# Patient Record
Sex: MALE | Race: WHITE | NOT HISPANIC OR LATINO
[De-identification: names, ages, dates, MRNs, and addresses within clinical notes are randomized per-mention and may not be internally consistent; named-entity substitution may affect disease eponyms.]

---

## 2019-12-03 ENCOUNTER — APPOINTMENT (OUTPATIENT)
Dept: RADIOLOGY | Facility: CLINIC | Age: 64
End: 2019-12-03
Payer: COMMERCIAL

## 2019-12-03 PROCEDURE — 71046 X-RAY EXAM CHEST 2 VIEWS: CPT

## 2019-12-04 ENCOUNTER — INPATIENT (INPATIENT)
Facility: HOSPITAL | Age: 64
LOS: 12 days | Discharge: ANOTHER HOSPITAL | End: 2019-12-17
Admitting: STUDENT IN AN ORGANIZED HEALTH CARE EDUCATION/TRAINING PROGRAM
Payer: COMMERCIAL

## 2019-12-04 ENCOUNTER — OUTPATIENT (OUTPATIENT)
Dept: OUTPATIENT SERVICES | Facility: HOSPITAL | Age: 64
LOS: 1 days | End: 2019-12-04

## 2019-12-04 PROCEDURE — 71046 X-RAY EXAM CHEST 2 VIEWS: CPT | Mod: 26

## 2019-12-04 PROCEDURE — 74177 CT ABD & PELVIS W/CONTRAST: CPT | Mod: 26

## 2019-12-04 PROCEDURE — 99285 EMERGENCY DEPT VISIT HI MDM: CPT

## 2019-12-04 PROCEDURE — 71260 CT THORAX DX C+: CPT | Mod: 26

## 2019-12-05 ENCOUNTER — OUTPATIENT (OUTPATIENT)
Dept: OUTPATIENT SERVICES | Facility: HOSPITAL | Age: 64
LOS: 1 days | End: 2019-12-05

## 2019-12-05 PROCEDURE — 93306 TTE W/DOPPLER COMPLETE: CPT | Mod: 26

## 2019-12-06 ENCOUNTER — OUTPATIENT (OUTPATIENT)
Dept: OUTPATIENT SERVICES | Facility: HOSPITAL | Age: 64
LOS: 1 days | End: 2019-12-06

## 2019-12-06 PROCEDURE — 49405 IMAGE CATH FLUID COLXN VISC: CPT | Mod: 59

## 2019-12-07 ENCOUNTER — OUTPATIENT (OUTPATIENT)
Dept: OUTPATIENT SERVICES | Facility: HOSPITAL | Age: 64
LOS: 1 days | End: 2019-12-07

## 2019-12-08 ENCOUNTER — OUTPATIENT (OUTPATIENT)
Dept: OUTPATIENT SERVICES | Facility: HOSPITAL | Age: 64
LOS: 1 days | End: 2019-12-08

## 2019-12-09 ENCOUNTER — OUTPATIENT (OUTPATIENT)
Dept: OUTPATIENT SERVICES | Facility: HOSPITAL | Age: 64
LOS: 1 days | End: 2019-12-09

## 2019-12-10 ENCOUNTER — OUTPATIENT (OUTPATIENT)
Dept: OUTPATIENT SERVICES | Facility: HOSPITAL | Age: 64
LOS: 1 days | End: 2019-12-10

## 2019-12-10 PROCEDURE — 74183 MRI ABD W/O CNTR FLWD CNTR: CPT | Mod: 26

## 2019-12-11 ENCOUNTER — OUTPATIENT (OUTPATIENT)
Dept: OUTPATIENT SERVICES | Facility: HOSPITAL | Age: 64
LOS: 1 days | End: 2019-12-11

## 2019-12-12 ENCOUNTER — OUTPATIENT (OUTPATIENT)
Dept: OUTPATIENT SERVICES | Facility: HOSPITAL | Age: 64
LOS: 1 days | End: 2019-12-12

## 2019-12-12 PROBLEM — Z00.00 ENCOUNTER FOR PREVENTIVE HEALTH EXAMINATION: Status: ACTIVE | Noted: 2019-12-12

## 2019-12-13 ENCOUNTER — OUTPATIENT (OUTPATIENT)
Dept: OUTPATIENT SERVICES | Facility: HOSPITAL | Age: 64
LOS: 1 days | End: 2019-12-13

## 2019-12-13 PROCEDURE — 71045 X-RAY EXAM CHEST 1 VIEW: CPT | Mod: 26

## 2019-12-14 ENCOUNTER — OUTPATIENT (OUTPATIENT)
Dept: OUTPATIENT SERVICES | Facility: HOSPITAL | Age: 64
LOS: 1 days | End: 2019-12-14

## 2019-12-15 ENCOUNTER — OUTPATIENT (OUTPATIENT)
Dept: OUTPATIENT SERVICES | Facility: HOSPITAL | Age: 64
LOS: 1 days | End: 2019-12-15

## 2019-12-16 ENCOUNTER — OUTPATIENT (OUTPATIENT)
Dept: OUTPATIENT SERVICES | Facility: HOSPITAL | Age: 64
LOS: 1 days | End: 2019-12-16

## 2019-12-17 ENCOUNTER — INPATIENT (INPATIENT)
Facility: HOSPITAL | Age: 64
LOS: 6 days | Discharge: HOME | DRG: 441 | End: 2019-12-24
Attending: INTERNAL MEDICINE | Admitting: INTERNAL MEDICINE
Payer: COMMERCIAL

## 2019-12-17 ENCOUNTER — OUTPATIENT (OUTPATIENT)
Dept: OUTPATIENT SERVICES | Facility: HOSPITAL | Age: 64
LOS: 1 days | End: 2019-12-17

## 2019-12-17 ENCOUNTER — RESULT REVIEW (OUTPATIENT)
Age: 64
End: 2019-12-17

## 2019-12-17 VITALS
DIASTOLIC BLOOD PRESSURE: 84 MMHG | OXYGEN SATURATION: 97 % | WEIGHT: 179.9 LBS | RESPIRATION RATE: 18 BRPM | SYSTOLIC BLOOD PRESSURE: 120 MMHG | TEMPERATURE: 98 F | HEART RATE: 90 BPM

## 2019-12-17 DIAGNOSIS — K75.0 ABSCESS OF LIVER: ICD-10-CM

## 2019-12-17 LAB
ALBUMIN SERPL ELPH-MCNC: 2.8 G/DL — LOW (ref 3.3–5.2)
ALP SERPL-CCNC: 130 U/L — HIGH (ref 40–120)
ALT FLD-CCNC: 15 U/L — SIGNIFICANT CHANGE UP
ANION GAP SERPL CALC-SCNC: 13 MMOL/L — SIGNIFICANT CHANGE UP (ref 5–17)
APTT BLD: 32.9 SEC — SIGNIFICANT CHANGE UP (ref 27.5–36.3)
AST SERPL-CCNC: 19 U/L — SIGNIFICANT CHANGE UP
BASOPHILS # BLD AUTO: 0.03 K/UL — SIGNIFICANT CHANGE UP (ref 0–0.2)
BASOPHILS NFR BLD AUTO: 0.2 % — SIGNIFICANT CHANGE UP (ref 0–2)
BILIRUB SERPL-MCNC: 0.2 MG/DL — LOW (ref 0.4–2)
BUN SERPL-MCNC: 11 MG/DL — SIGNIFICANT CHANGE UP (ref 8–20)
CALCIUM SERPL-MCNC: 9.3 MG/DL — SIGNIFICANT CHANGE UP (ref 8.6–10.2)
CHLORIDE SERPL-SCNC: 97 MMOL/L — LOW (ref 98–107)
CO2 SERPL-SCNC: 23 MMOL/L — SIGNIFICANT CHANGE UP (ref 22–29)
CREAT SERPL-MCNC: 0.65 MG/DL — SIGNIFICANT CHANGE UP (ref 0.5–1.3)
EOSINOPHIL # BLD AUTO: 0.48 K/UL — SIGNIFICANT CHANGE UP (ref 0–0.5)
EOSINOPHIL NFR BLD AUTO: 2.9 % — SIGNIFICANT CHANGE UP (ref 0–6)
FOLATE SERPL-MCNC: 12.1 NG/ML — SIGNIFICANT CHANGE UP
GLUCOSE SERPL-MCNC: 88 MG/DL — SIGNIFICANT CHANGE UP (ref 70–115)
HCT VFR BLD CALC: 28.2 % — LOW (ref 39–50)
HGB BLD-MCNC: 8.6 G/DL — LOW (ref 13–17)
IMM GRANULOCYTES NFR BLD AUTO: 1 % — SIGNIFICANT CHANGE UP (ref 0–1.5)
INR BLD: 1.24 RATIO — HIGH (ref 0.88–1.16)
LIDOCAIN IGE QN: 16 U/L — LOW (ref 22–51)
LYMPHOCYTES # BLD AUTO: 19.7 % — SIGNIFICANT CHANGE UP (ref 13–44)
LYMPHOCYTES # BLD AUTO: 3.31 K/UL — HIGH (ref 1–3.3)
MCHC RBC-ENTMCNC: 26.7 PG — LOW (ref 27–34)
MCHC RBC-ENTMCNC: 30.5 GM/DL — LOW (ref 32–36)
MCV RBC AUTO: 87.6 FL — SIGNIFICANT CHANGE UP (ref 80–100)
MONOCYTES # BLD AUTO: 1.29 K/UL — HIGH (ref 0–0.9)
MONOCYTES NFR BLD AUTO: 7.7 % — SIGNIFICANT CHANGE UP (ref 2–14)
NEUTROPHILS # BLD AUTO: 11.54 K/UL — HIGH (ref 1.8–7.4)
NEUTROPHILS NFR BLD AUTO: 68.5 % — SIGNIFICANT CHANGE UP (ref 43–77)
PLATELET # BLD AUTO: 356 K/UL — SIGNIFICANT CHANGE UP (ref 150–400)
POTASSIUM SERPL-MCNC: 4.5 MMOL/L — SIGNIFICANT CHANGE UP (ref 3.5–5.3)
POTASSIUM SERPL-SCNC: 4.5 MMOL/L — SIGNIFICANT CHANGE UP (ref 3.5–5.3)
PROT SERPL-MCNC: 6.9 G/DL — SIGNIFICANT CHANGE UP (ref 6.6–8.7)
PROTHROM AB SERPL-ACNC: 14.4 SEC — HIGH (ref 10–12.9)
RBC # BLD: 3.22 M/UL — LOW (ref 4.2–5.8)
RBC # FLD: 16.2 % — HIGH (ref 10.3–14.5)
SODIUM SERPL-SCNC: 133 MMOL/L — LOW (ref 135–145)
WBC # BLD: 16.81 K/UL — HIGH (ref 3.8–10.5)
WBC # FLD AUTO: 16.81 K/UL — HIGH (ref 3.8–10.5)

## 2019-12-17 PROCEDURE — 74177 CT ABD & PELVIS W/CONTRAST: CPT | Mod: 26

## 2019-12-17 PROCEDURE — 88305 TISSUE EXAM BY PATHOLOGIST: CPT | Mod: 26

## 2019-12-17 PROCEDURE — 99285 EMERGENCY DEPT VISIT HI MDM: CPT

## 2019-12-17 PROCEDURE — 99223 1ST HOSP IP/OBS HIGH 75: CPT

## 2019-12-17 PROCEDURE — 71260 CT THORAX DX C+: CPT | Mod: 26

## 2019-12-17 PROCEDURE — 88112 CYTOPATH CELL ENHANCE TECH: CPT | Mod: 26

## 2019-12-17 RX ORDER — SIMVASTATIN 20 MG/1
20 TABLET, FILM COATED ORAL AT BEDTIME
Refills: 0 | Status: DISCONTINUED | OUTPATIENT
Start: 2019-12-17 | End: 2019-12-24

## 2019-12-17 RX ORDER — ENOXAPARIN SODIUM 100 MG/ML
40 INJECTION SUBCUTANEOUS DAILY
Refills: 0 | Status: DISCONTINUED | OUTPATIENT
Start: 2019-12-17 | End: 2019-12-19

## 2019-12-17 RX ORDER — SODIUM CHLORIDE 9 MG/ML
1000 INJECTION INTRAMUSCULAR; INTRAVENOUS; SUBCUTANEOUS ONCE
Refills: 0 | Status: COMPLETED | OUTPATIENT
Start: 2019-12-17 | End: 2019-12-17

## 2019-12-17 RX ORDER — TRAMADOL HYDROCHLORIDE 50 MG/1
25 TABLET ORAL THREE TIMES A DAY
Refills: 0 | Status: DISCONTINUED | OUTPATIENT
Start: 2019-12-17 | End: 2019-12-18

## 2019-12-17 RX ORDER — VENLAFAXINE HCL 75 MG
150 CAPSULE, EXT RELEASE 24 HR ORAL DAILY
Refills: 0 | Status: DISCONTINUED | OUTPATIENT
Start: 2019-12-17 | End: 2019-12-24

## 2019-12-17 RX ORDER — PANTOPRAZOLE SODIUM 20 MG/1
40 TABLET, DELAYED RELEASE ORAL
Refills: 0 | Status: DISCONTINUED | OUTPATIENT
Start: 2019-12-17 | End: 2019-12-24

## 2019-12-17 RX ORDER — PIPERACILLIN AND TAZOBACTAM 4; .5 G/20ML; G/20ML
3.38 INJECTION, POWDER, LYOPHILIZED, FOR SOLUTION INTRAVENOUS ONCE
Refills: 0 | Status: COMPLETED | OUTPATIENT
Start: 2019-12-17 | End: 2019-12-17

## 2019-12-17 RX ORDER — LACTOBACILLUS ACIDOPHILUS 100MM CELL
1 CAPSULE ORAL
Refills: 0 | Status: DISCONTINUED | OUTPATIENT
Start: 2019-12-17 | End: 2019-12-24

## 2019-12-17 RX ORDER — PIPERACILLIN AND TAZOBACTAM 4; .5 G/20ML; G/20ML
3.38 INJECTION, POWDER, LYOPHILIZED, FOR SOLUTION INTRAVENOUS EVERY 8 HOURS
Refills: 0 | Status: COMPLETED | OUTPATIENT
Start: 2019-12-17 | End: 2019-12-24

## 2019-12-17 RX ORDER — ALPRAZOLAM 0.25 MG
0.25 TABLET ORAL THREE TIMES A DAY
Refills: 0 | Status: DISCONTINUED | OUTPATIENT
Start: 2019-12-17 | End: 2019-12-24

## 2019-12-17 RX ORDER — LANOLIN ALCOHOL/MO/W.PET/CERES
3 CREAM (GRAM) TOPICAL AT BEDTIME
Refills: 0 | Status: DISCONTINUED | OUTPATIENT
Start: 2019-12-17 | End: 2019-12-24

## 2019-12-17 RX ADMIN — Medication 0.25 MILLIGRAM(S): at 23:52

## 2019-12-17 RX ADMIN — TRAMADOL HYDROCHLORIDE 25 MILLIGRAM(S): 50 TABLET ORAL at 18:37

## 2019-12-17 RX ADMIN — SODIUM CHLORIDE 1000 MILLILITER(S): 9 INJECTION INTRAMUSCULAR; INTRAVENOUS; SUBCUTANEOUS at 17:05

## 2019-12-17 RX ADMIN — PIPERACILLIN AND TAZOBACTAM 200 GRAM(S): 4; .5 INJECTION, POWDER, LYOPHILIZED, FOR SOLUTION INTRAVENOUS at 18:36

## 2019-12-17 NOTE — CONSULT NOTE ADULT - ASSESSMENT
The patient with fever, liver abscess, lung lesion. ? concern for cholangiocarcinoma. Patient has improved a lot. Most likely infectious etiology. The periductal changes in right liver lobe can be inflammatory. I will recommend repeating CT chest, CT abdomen/pelvis (liver protocol).  Continue diet  Anemia: Stable from previous labs. Check iron level, ferritin in am, vitamin b12, folate  No need for ERCP at this time, till all the imaging is done  ID consult

## 2019-12-17 NOTE — H&P ADULT - HISTORY OF PRESENT ILLNESS
64 yr old with PMH of Depression, HLD was complaining of lethargy, wt loss 10 lb, fever and chills for some time. He did not initially pursue any help as his dog was sick. Then he went to his PCP and noted to have abnormal labs. He went to [facility] on 19. Was noted to have Leucocytosis, thrombocytosis, Ct imaging showing Rt hepatic lobe massess, pulm nodules concerning for malig/ metastasis, Cholelithiasis without cholecystitis. Hepatic abscess suspected and 2 drains inserted by IR. Fluid c/s from 19 positive for Gram neg rods. Blood c/s neg to date. Was treated with Vanc/ Zosyn/ Flagyl. 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 Tumor markers normal.   Denies fever/ pain/ n/v    SH- former smoker, current alcohol use, current marijuana use  FH- M-  - leukemia; F- alive

## 2019-12-17 NOTE — ED ADULT NURSE NOTE - CHIEF COMPLAINT QUOTE
pt BIBA from [facility] [place] as transfer for r/o liver mass and for GI intervention. pt AOX3 on arrival, 22g to right hand in place. pt sent for ERCP and endoscopy. pt offering no complaints on arrival. even and unlabored resps.

## 2019-12-17 NOTE — CONSULT NOTE ADULT - SUBJECTIVE AND OBJECTIVE BOX
HPI: 64 year old man who was in [facility] and now being transferred to [facility]. He was admitted to [facility] [place] for fever, chills, leukocytosis, liver lesion and lung lesions. He was complaining of fever and chills for some time. He did not initially pursue any help as his dog was sick. Then he went to his PCP and noted to have abnormal labs. He was noted to have lung lesions. Admitted to [facility] [place]. Noted to have WBC of 42 and about 7 cm liver lesion in right lobe. There was concern for lung and adrenal nodules. He had blood cultures which were negative. IR drain was placed which showed liver abscess. He had gram negative rods-not identified yet. Seen by ID. 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 He is on antibiotics. His [id number]-Mgf phosphatase which was initially 400 + has normalized. Bilirubin, AST, ALT are normal. No abdominal pain. Eating ok.   Ca 19-9 level, AFP level normal.     There is ? hemochromatosis history. Also noted to be anemic. He had colonoscopy this year in april. As per patient it was normal.    TTE was unremarkable.      PAST MEDICAL & SURGICAL HISTORY:  Cholesterol depletion  Depression      ROS:  No Heartburn, regurgitation, dysphagia, odynophagia.  No dyspepsia  No abdominal pain.    No Nausea, vomiting.  No Bleeding.  No hematemesis.   No diarrhea.    No hematochesia.  No weight loss, anorexia.  No edema.      MEDICATIONS  (STANDING):  sodium chloride 0.9% Bolus 1000 milliLiter(s) IV Bolus once    MEDICATIONS  (PRN):      Allergies    No Known Allergies    Intolerances        SOCIAL HISTORY:Denies x 3    ENDOSCOPIC/GI HISTORY:    FAMILY HISTORY:NC      Vital Signs Last 24 Hrs  T(C): 36.9 (17 Dec 2019 14:51), Max: 36.9 (17 Dec 2019 14:51)  T(F): 98.4 (17 Dec 2019 14:51), Max: 98.4 (17 Dec 2019 14:51)  HR: 90 (17 Dec 2019 14:51) (90 - 90)  BP: 120/84 (17 Dec 2019 14:51) (120/84 - 120/84)  BP(mean): --  RR: 18 (17 Dec 2019 14:51) (18 - 18)  SpO2: 97% (17 Dec 2019 14:51) (97% - 97%)    PHYSICAL EXAM:    GENERAL: NAD, well-groomed, well-developed  HEAD:  Atraumatic, Normocephalic  EYES: EOMI, PERRLA, conjunctiva and sclera clear  ENMT: No tonsillar erythema, exudates, or enlargement; Moist mucous membranes, Good dentition, No lesions  NECK: Supple, No JVD, Normal thyroid  CHEST/LUNG: Clear to percussion bilaterally; No rales, rhonchi, wheezing, or rubs  HEART: Regular rate and rhythm; No murmurs, rubs, or gallops  ABDOMEN: Soft, Nontender, Nondistended; Bowel sounds present  EXTREMITIES:  2+ Peripheral Pulses, No clubbing, cyanosis, or edema  LYMPH: No lymphadenopathy noted  SKIN: No rashes or lesions      LABS:                        8.6    16.81 )-----------( 356      ( 17 Dec 2019 16:22 )             28.2     12-17    133<L>  |  97<L>  |  11.0  ----------------------------<  88  4.5   |  23.0  |  0.65    Ca    9.3      17 Dec 2019 16:22    TPro  6.9  /  Alb  2.8<L>  /  TBili  0.2<L>  /  DBili  x   /  AST  19  /  ALT  15  /  AlkPhos  130<H>  12-17    PT/INR - ( 17 Dec 2019 16:21 )   PT: 14.4 sec;   INR: 1.24 ratio         PTT - ( 17 Dec 2019 16:21 )  PTT:32.9 sec       LIVER FUNCTIONS - ( 17 Dec 2019 16:22 )  Alb: 2.8 g/dL / Pro: 6.9 g/dL / ALK PHOS: 130 U/L / ALT: 15 U/L / AST: 19 U/L / GGT: x               RADIOLOGY & ADDITIONAL STUDIES: HPI: 64 year old man who was in [facility] and now being transferred to [facility]. He was admitted to [facility] for fever, chills, leukocytosis, liver lesion and lung lesions. He was complaining of fever and chills for some time. He did not initially pursue any help as his dog was sick. Then he went to his PCP and noted to have abnormal labs. He was noted to have lung lesions. Admitted to [facility]. Noted to have WBC of 42 and about 7 cm liver lesion in right lobe. There was concern for lung and adrenal nodules. He had blood cultures which were negative. IR drain was placed which showed liver abscess. He had gram negative rods-not identified yet. Seen by ID. 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 He is on antibiotics. His [id number]-Oov phosphatase which was initially 400 + has normalized. Bilirubin, AST, ALT are normal. No abdominal pain. Eating ok.   Ca 19-9 level, AFP level normal.     There is ? hemochromatosis history. Also noted to be anemic. He had colonoscopy this year in april. As per patient it was normal.    TTE was unremarkable.      PAST MEDICAL & SURGICAL HISTORY:  Cholesterol depletion  Depression      ROS:  No Heartburn, regurgitation, dysphagia, odynophagia.  No dyspepsia  No abdominal pain.    No Nausea, vomiting.  No Bleeding.  No hematemesis.   No diarrhea.    No hematochesia.  No weight loss, anorexia.  No edema.      MEDICATIONS  (STANDING):  sodium chloride 0.9% Bolus 1000 milliLiter(s) IV Bolus once    MEDICATIONS  (PRN):      Allergies    No Known Allergies    Intolerances        SOCIAL HISTORY:Denies x 3    ENDOSCOPIC/GI HISTORY:    FAMILY HISTORY:NC      Vital Signs Last 24 Hrs  T(C): 36.9 (17 Dec 2019 14:51), Max: 36.9 (17 Dec 2019 14:51)  T(F): 98.4 (17 Dec 2019 14:51), Max: 98.4 (17 Dec 2019 14:51)  HR: 90 (17 Dec 2019 14:51) (90 - 90)  BP: 120/84 (17 Dec 2019 14:51) (120/84 - 120/84)  BP(mean): --  RR: 18 (17 Dec 2019 14:51) (18 - 18)  SpO2: 97% (17 Dec 2019 14:51) (97% - 97%)    PHYSICAL EXAM:    GENERAL: NAD, well-groomed, well-developed  HEAD:  Atraumatic, Normocephalic  EYES: EOMI, PERRLA, conjunctiva and sclera clear  ENMT: No tonsillar erythema, exudates, or enlargement; Moist mucous membranes, Good dentition, No lesions  NECK: Supple, No JVD, Normal thyroid  CHEST/LUNG: Clear to percussion bilaterally; No rales, rhonchi, wheezing, or rubs  HEART: Regular rate and rhythm; No murmurs, rubs, or gallops  ABDOMEN: Soft, Nontender, Nondistended; Bowel sounds present. Right sided drain in place  EXTREMITIES:  2+ Peripheral Pulses, No clubbing, cyanosis, or edema  LYMPH: No lymphadenopathy noted  SKIN: No rashes or lesions      LABS:                        8.6    16.81 )-----------( 356      ( 17 Dec 2019 16:22 )             28.2     12-17    133<L>  |  97<L>  |  11.0  ----------------------------<  88  4.5   |  23.0  |  0.65    Ca    9.3      17 Dec 2019 16:22    TPro  6.9  /  Alb  2.8<L>  /  TBili  0.2<L>  /  DBili  x   /  AST  19  /  ALT  15  /  AlkPhos  130<H>  12-17    PT/INR - ( 17 Dec 2019 16:21 )   PT: 14.4 sec;   INR: 1.24 ratio         PTT - ( 17 Dec 2019 16:21 )  PTT:32.9 sec       LIVER FUNCTIONS - ( 17 Dec 2019 16:22 )  Alb: 2.8 g/dL / Pro: 6.9 g/dL / ALK PHOS: 130 U/L / ALT: 15 U/L / AST: 19 U/L / GGT: x               RADIOLOGY & ADDITIONAL STUDIES:

## 2019-12-17 NOTE — H&P ADULT - NSHPPHYSICALEXAM_GEN_ALL_CORE
Vital Signs Last 24 Hrs  T(C): 36.9 (12-17-19 @ 14:51), Max: 36.9 (12-17-19 @ 14:51)  T(F): 98.4 (12-17-19 @ 14:51), Max: 98.4 (12-17-19 @ 14:51)  HR: 90 (12-17-19 @ 14:51) (90 - 90)  BP: 120/84 (12-17-19 @ 14:51) (120/84 - 120/84)  BP(mean): --  RR: 18 (12-17-19 @ 14:51) (18 - 18)  SpO2: 97% (12-17-19 @ 14:51) (97% - 97%)  GENERAL: NAD, well-groomed, well-developed  HEAD:  Atraumatic, Normocephalic  EYES: EOMI, PERRLA, conjunctiva and sclera clear  ENT: No tonsillar erythema, exudates, or enlargement; Moist mucous membranes, Good dentition, No lesions  NECK: Supple, No JVD, Normal thyroid  CHEST/LUNG: Clear to percussion bilaterally; No rales, rhonchi, wheezing, or rubs  HEART: Regular rate and rhythm; No murmurs, rubs, or gallops  ABDOMEN: Soft, Nontender, Nondistended; Bowel sounds present. Right sided drain in place  EXTREMITIES:  2+ Peripheral Pulses, No clubbing, cyanosis, or edema  LYMPH: No lymphadenopathy noted  SKIN: No rashes or lesions

## 2019-12-17 NOTE — H&P ADULT - ASSESSMENT
64 yr old with PMH of Depression, HLD sent from [facility] [place] while on Rx for liver abscess for w/u and treatment of ? Cholangiocarcinoma    > Hepatic abscess  Will cont Zosyn until ID eval  ID consult  Cont diet  GI consult appreciated    > possible cholangiocarcinoma  per GI need liver protocol CT scans prior to invasive testing 64 yr old with PMH of Depression, HLD sent from [facility] [place] while on Rx for liver abscess since 12/4/19, for w/u and treatment of ? Cholangiocarcinoma    > Hepatic abscess  Fluid c/s from [facility] 12/6/19 Gram neg rods; and blood c/s from 12/4/19 negative from [facility] [place]  abscess drained by IR at [facility] on 12/6/19  Will cont Zosyn until ID eval  ID consult  Cont diet  GI consult appreciated  Tramadol for pain    > possible cholangiocarcinoma  per GI need liver protocol CT scans prior to invasive testing  rpt tumor markers    > Leucocytosis  sec to above  will rpt blood c/s here again now    > Thrombocytosis  ? reactive    > Anemia  iron studies    > HLD, Depression  LFTs normalized, can cont statins  Cont Venlafaxine, Xanax, Melatonin    Lovenox

## 2019-12-17 NOTE — ED ADULT NURSE NOTE - OBJECTIVE STATEMENT
pt BIBA from [facility] as transfer for r/o liver mass and for GI intervention. pt AOX3 on arrival, 22g to right hand in place. pt sent for ERCP and endoscopy. pt offering no complaints on arrival. even and unlabored resps.

## 2019-12-17 NOTE — ED ADULT TRIAGE NOTE - CHIEF COMPLAINT QUOTE
pt BIBA from [facility] as transfer for r/o liver mass and for GI intervention. pt AOX3 on arrival, 22g to right hand in place. pt sent for ERCP and endoscopy. pt offering no complaints on arrival. even and unlabored resps.

## 2019-12-18 DIAGNOSIS — K75.0 ABSCESS OF LIVER: ICD-10-CM

## 2019-12-18 LAB
AFP-TM SERPL-MCNC: 2.4 NG/ML — SIGNIFICANT CHANGE UP
ALBUMIN SERPL ELPH-MCNC: 2.8 G/DL — LOW (ref 3.3–5.2)
ALP SERPL-CCNC: 115 U/L — SIGNIFICANT CHANGE UP (ref 40–120)
ALT FLD-CCNC: 14 U/L — SIGNIFICANT CHANGE UP
ANION GAP SERPL CALC-SCNC: 13 MMOL/L — SIGNIFICANT CHANGE UP (ref 5–17)
AST SERPL-CCNC: 16 U/L — SIGNIFICANT CHANGE UP
BILIRUB SERPL-MCNC: 0.4 MG/DL — SIGNIFICANT CHANGE UP (ref 0.4–2)
BUN SERPL-MCNC: 8 MG/DL — SIGNIFICANT CHANGE UP (ref 8–20)
CALCIUM SERPL-MCNC: 9.2 MG/DL — SIGNIFICANT CHANGE UP (ref 8.6–10.2)
CANCER AG19-9 SERPL-ACNC: 11 U/ML — SIGNIFICANT CHANGE UP
CEA SERPL-MCNC: 1.1 NG/ML — SIGNIFICANT CHANGE UP (ref 0–3.8)
CHLORIDE SERPL-SCNC: 99 MMOL/L — SIGNIFICANT CHANGE UP (ref 98–107)
CO2 SERPL-SCNC: 25 MMOL/L — SIGNIFICANT CHANGE UP (ref 22–29)
CREAT SERPL-MCNC: 0.75 MG/DL — SIGNIFICANT CHANGE UP (ref 0.5–1.3)
CRP SERPL-MCNC: 4.67 MG/DL — HIGH (ref 0–0.4)
FERRITIN SERPL-MCNC: 1823 NG/ML — HIGH (ref 30–400)
GLUCOSE SERPL-MCNC: 179 MG/DL — HIGH (ref 70–115)
HCT VFR BLD CALC: 26.8 % — LOW (ref 39–50)
HCV AB S/CO SERPL IA: 0.18 S/CO — SIGNIFICANT CHANGE UP (ref 0–0.99)
HCV AB SERPL-IMP: SIGNIFICANT CHANGE UP
HGB BLD-MCNC: 8.2 G/DL — LOW (ref 13–17)
IRON SATN MFR SERPL: 27 % — SIGNIFICANT CHANGE UP (ref 16–55)
IRON SATN MFR SERPL: 59 UG/DL — SIGNIFICANT CHANGE UP (ref 59–158)
MCHC RBC-ENTMCNC: 26.8 PG — LOW (ref 27–34)
MCHC RBC-ENTMCNC: 30.6 GM/DL — LOW (ref 32–36)
MCV RBC AUTO: 87.6 FL — SIGNIFICANT CHANGE UP (ref 80–100)
PLATELET # BLD AUTO: 340 K/UL — SIGNIFICANT CHANGE UP (ref 150–400)
POTASSIUM SERPL-MCNC: 4.1 MMOL/L — SIGNIFICANT CHANGE UP (ref 3.5–5.3)
POTASSIUM SERPL-SCNC: 4.1 MMOL/L — SIGNIFICANT CHANGE UP (ref 3.5–5.3)
PROT SERPL-MCNC: 6.5 G/DL — LOW (ref 6.6–8.7)
RBC # BLD: 3.06 M/UL — LOW (ref 4.2–5.8)
RBC # FLD: 16.7 % — HIGH (ref 10.3–14.5)
SODIUM SERPL-SCNC: 137 MMOL/L — SIGNIFICANT CHANGE UP (ref 135–145)
TIBC SERPL-MCNC: 220 UG/DL — SIGNIFICANT CHANGE UP (ref 220–430)
TRANSFERRIN SERPL-MCNC: 154 MG/DL — LOW (ref 180–329)
VIT B12 SERPL-MCNC: 772 PG/ML — SIGNIFICANT CHANGE UP (ref 232–1245)
WBC # BLD: 11.14 K/UL — HIGH (ref 3.8–10.5)
WBC # FLD AUTO: 11.14 K/UL — HIGH (ref 3.8–10.5)

## 2019-12-18 PROCEDURE — 99233 SBSQ HOSP IP/OBS HIGH 50: CPT

## 2019-12-18 PROCEDURE — 99223 1ST HOSP IP/OBS HIGH 75: CPT

## 2019-12-18 RX ORDER — OXYCODONE HYDROCHLORIDE 5 MG/1
5 TABLET ORAL EVERY 4 HOURS
Refills: 0 | Status: DISCONTINUED | OUTPATIENT
Start: 2019-12-18 | End: 2019-12-24

## 2019-12-18 RX ORDER — ONDANSETRON 8 MG/1
4 TABLET, FILM COATED ORAL EVERY 6 HOURS
Refills: 0 | Status: DISCONTINUED | OUTPATIENT
Start: 2019-12-18 | End: 2019-12-24

## 2019-12-18 RX ORDER — IRON SUCROSE 20 MG/ML
200 INJECTION, SOLUTION INTRAVENOUS EVERY 24 HOURS
Refills: 0 | Status: DISCONTINUED | OUTPATIENT
Start: 2019-12-18 | End: 2019-12-19

## 2019-12-18 RX ORDER — ACETAMINOPHEN 500 MG
650 TABLET ORAL EVERY 6 HOURS
Refills: 0 | Status: DISCONTINUED | OUTPATIENT
Start: 2019-12-18 | End: 2019-12-24

## 2019-12-18 RX ORDER — INFLUENZA VIRUS VACCINE 15; 15; 15; 15 UG/.5ML; UG/.5ML; UG/.5ML; UG/.5ML
0.5 SUSPENSION INTRAMUSCULAR ONCE
Refills: 0 | Status: DISCONTINUED | OUTPATIENT
Start: 2019-12-18 | End: 2019-12-24

## 2019-12-18 RX ADMIN — IRON SUCROSE 110 MILLIGRAM(S): 20 INJECTION, SOLUTION INTRAVENOUS at 17:56

## 2019-12-18 RX ADMIN — PIPERACILLIN AND TAZOBACTAM 25 GRAM(S): 4; .5 INJECTION, POWDER, LYOPHILIZED, FOR SOLUTION INTRAVENOUS at 06:04

## 2019-12-18 RX ADMIN — PIPERACILLIN AND TAZOBACTAM 25 GRAM(S): 4; .5 INJECTION, POWDER, LYOPHILIZED, FOR SOLUTION INTRAVENOUS at 14:30

## 2019-12-18 RX ADMIN — Medication 3 MILLIGRAM(S): at 22:42

## 2019-12-18 RX ADMIN — Medication 1 TABLET(S): at 06:05

## 2019-12-18 RX ADMIN — Medication 0.25 MILLIGRAM(S): at 15:14

## 2019-12-18 RX ADMIN — OXYCODONE HYDROCHLORIDE 5 MILLIGRAM(S): 5 TABLET ORAL at 20:07

## 2019-12-18 RX ADMIN — SIMVASTATIN 20 MILLIGRAM(S): 20 TABLET, FILM COATED ORAL at 22:42

## 2019-12-18 RX ADMIN — Medication 150 MILLIGRAM(S): at 00:15

## 2019-12-18 RX ADMIN — TRAMADOL HYDROCHLORIDE 25 MILLIGRAM(S): 50 TABLET ORAL at 00:15

## 2019-12-18 RX ADMIN — PIPERACILLIN AND TAZOBACTAM 25 GRAM(S): 4; .5 INJECTION, POWDER, LYOPHILIZED, FOR SOLUTION INTRAVENOUS at 22:42

## 2019-12-18 RX ADMIN — TRAMADOL HYDROCHLORIDE 25 MILLIGRAM(S): 50 TABLET ORAL at 01:15

## 2019-12-18 RX ADMIN — OXYCODONE HYDROCHLORIDE 5 MILLIGRAM(S): 5 TABLET ORAL at 21:07

## 2019-12-18 RX ADMIN — PANTOPRAZOLE SODIUM 40 MILLIGRAM(S): 20 TABLET, DELAYED RELEASE ORAL at 06:05

## 2019-12-18 RX ADMIN — ENOXAPARIN SODIUM 40 MILLIGRAM(S): 100 INJECTION SUBCUTANEOUS at 14:34

## 2019-12-18 RX ADMIN — Medication 1 TABLET(S): at 17:56

## 2019-12-18 RX ADMIN — Medication 0.25 MILLIGRAM(S): at 22:42

## 2019-12-18 NOTE — PROGRESS NOTE ADULT - SUBJECTIVE AND OBJECTIVE BOX
Abscess of liver    HPI:  64 yr old with PMH of Depression, HLD was complaining of lethargy, wt loss 10 lb, fever and chills for some time. He did not initially pursue any help as his dog was sick. Then he went to his PCP and noted to have abnormal labs. He went to [facility] on 19. Was noted to have Leucocytosis, thrombocytosis, Ct imaging showing Rt hepatic lobe massess, pulm nodules concerning for malig/ metastasis, Cholelithiasis without cholecystitis. Hepatic abscess suspected and 2 drains inserted by IR. Fluid c/s from 19 positive for Gram neg rods. Blood c/s neg to date. Was treated with Vanc/ Zosyn/ Flagyl. 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 Tumor markers normal.   Denies fever/ pain/ n/v    SH- former smoker, current alcohol use, current marijuana use  FH- M-  - leukemia; F- alive (17 Dec 2019 17:40)    Interval History:  Patient was seen and examined at bedside around 12:15 pm. Feeling better. Unable to take deep breaths due to pain at tube sites.   Denies chest pain, palpitations, shortness of breath, headache, dizziness, visual symptoms, nausea, vomiting or abdominal pain.  No overnight issues.    ROS:  As per interval history otherwise unremarkable.    PHYSICAL EXAM:  Vital Signs   T(C): 36.6 (18 Dec 2019 15:51), Max: 37.3 (17 Dec 2019 18:20)  T(F): 97.8 (18 Dec 2019 15:51), Max: 99.1 (17 Dec 2019 18:20)  HR: 98 (18 Dec 2019 15:51) (89 - 116)  BP: 123/76 (18 Dec 2019 15:51) (121/70 - 139/85)  RR: 18 (18 Dec 2019 15:51) (18 - 19)  SpO2: 98% (18 Dec 2019 15:51) (86% - 99%)  General: Well developed. Well nourished. No acute distress  HEENT: PERRLA. EOMI. Clear conjunctivae. Moist mucus membrane  Neck: Supple.   Chest: Decreased air entry at right base. No wheezing, rales or rhonchi. No chest wall tenderness.  Heart: Normal S1 & S2. RRR.   Abdomen: Soft. Non-tender. Non-distended. + BS. 2 drains in RUQ.   Ext: No pedal edema. No calf tenderness   Neuro: AAO x 3. No focal deficit. No speech disorder  Skin: Warm and Dry  Psychiatry: Normal mood and affect    I&O's Summary    17 Dec 2019 07:01  -  18 Dec 2019 07:00  --------------------------------------------------------  IN: 0 mL / OUT: 600 mL / NET: -600 mL    MEDICATIONS  (STANDING):  enoxaparin Injectable 40 milliGRAM(s) SubCutaneous daily  influenza   Vaccine 0.5 milliLiter(s) IntraMuscular once  lactobacillus acidophilus 1 Tablet(s) Oral two times a day  pantoprazole    Tablet 40 milliGRAM(s) Oral before breakfast  piperacillin/tazobactam IVPB.. 3.375 Gram(s) IV Intermittent every 8 hours  simvastatin 20 milliGRAM(s) Oral at bedtime  venlafaxine XR. 150 milliGRAM(s) Oral daily    MEDICATIONS  (PRN):  ALPRAZolam 0.25 milliGRAM(s) Oral three times a day PRN anxiety  melatonin 3 milliGRAM(s) Oral at bedtime PRN Insomnia  traMADol 25 milliGRAM(s) Oral three times a day PRN Mild Pain (1 - 3)      LABS:                        8.2    11.14 )-----------( 340      ( 18 Dec 2019 09:58 )             26.8     12-18    137  |  99  |  8.0  ----------------------------<  179<H>  4.1   |  25.0  |  0.75    Ca    9.2      18 Dec 2019 09:58    TPro  6.5<L>  /  Alb  2.8<L>  /  TBili  0.4  /  DBili  x   /  AST  16  /  ALT  14  /  AlkPhos  115  12-18    PT/INR - ( 17 Dec 2019 16:21 )   PT: 14.4 sec;   INR: 1.24 ratio         PTT - ( 17 Dec 2019 16:21 )  PTT:32.9 sec    RADIOLOGY & ADDITIONAL STUDIES:  CT Chest w/ IV Cont (12.17.19 @ 20:49)   Moderate partially loculated right pleural effusion with complete atelectasis of the right lower lobe.  Small left pleural effusion with atelectasis at the inferior aspect of the left lower lobe.  1.1 cm left upper lobe pulmonary nodule, decreased in size since 2019, previously 1.9 cm  Previously seen right-sided pulmonary nodules not seen on the current study which may be secondary to compressive atelectasis in the right lower and middle lobes; the pulmonary nodules were likely infectious in etiology.    CT Abdomen and Pelvis w/ Oral Cont and w/ IV Cont (19 @ 20:52)   2 drainage catheters in the liver which terminate and fluid collections measuring 3.3 x 2.1 cm and 3.2 x 1.4 cm.  Additional linear foci of low attenuation in the right hepatic lobe, either additional areas of fluid or dilated intrahepatic biliary radicles.

## 2019-12-18 NOTE — PROGRESS NOTE ADULT - ASSESSMENT
64 yr old with PMH of Depression, HLD sent from [facility] while on Rx for liver abscess since 12/4/19, for w/u and treatment of ? Cholangiocarcinoma,    1) Liver abscesses   - Fluid c/s from [facility] 12/6/19 Gram neg rods; and blood c/s from 12/4/19 negative from [facility]  - s/p IR guided drainage at [facility] on 12/6/19  - Continue Zosyn  - ID Consult appreciated     2) Rule out cholangiocarcinoma  - As per GI: Patient has improved a lot. Most likely infectious etiology.  - Tumor markers pending     3) Lung Nodules and Right Pleural Effusion  - No respiratory distress   - Pulmonary Consult     4) Anemia  - Likely chronic   - Low iron stores, will start on Venofer 200 mg x 5 doses     5) HLD  - Continue Simvastatin    6) Depression  - Continue Venlafaxin     DVT Prophylaxis -- Lovenox 40 mg     Dispo: Unclear pending further work up.

## 2019-12-18 NOTE — CONSULT NOTE ADULT - ASSESSMENT
63y/o  Male  who was transferred from [facility] to [facility]. Patient was initially admitted to [facility] for fever, chills, leukocytosis, was thought to have pneumonia and was also found to have a liver lesion. He had blood cultures which were negative. IR drain was placed which showed liver abscess. Cultures from the abscess with GNR anaerobic. He was maintained on Vancomycin and Zosyn at [facility]. Here patient has been afebrile. Had repeat imaging. Seen by GI. Maintained on Zosyn.      Hepatic abscess  s/p IR drainage at [facility]  Culture with Gram negative rods (Anaerobic) - Limited growth and not able to be identify       - Blood cultures here pending  - [facility] blood cultures negative  - Hepatic abscess cultures Gram negative rods (Anaerobic) - Limited growth and not able to be identify   - CT A/P with hepatic abscess with drain  - Continue Zosyn  - GI consult noted  - Trend Fever  - Trend Leukocytosis      Will Follow 65y/o  Male  who was transferred from [facility] to [facility]. Patient was initially admitted to [facility] [place] for fever, chills, leukocytosis, was thought to have pneumonia and was also found to have a liver lesion. He had blood cultures which were negative. IR drain was placed which showed liver abscess. Cultures from the abscess with GNR anaerobic. He was maintained on Vancomycin and Zosyn at [facility] [place]. Here patient has been afebrile. Had repeat imaging. Seen by GI. Maintained on Zosyn.      Hepatic abscess  s/p IR drainage at [facility] [place]  Culture with Gram negative rods (Anaerobic) - Limited growth and not able to be identify       - Blood cultures here pending  - [facility] [place] blood cultures negative  - Hepatic abscess cultures Gram negative rods (Anaerobic) - Limited growth and not able to be identify   - CT A/P with hepatic abscess with drain  - Continue Zosyn  - GI consult noted  - Trend Fever  - Trend Leukocytosis  - [facility] [place] records reviewed (40 minutes)      Will Follow

## 2019-12-18 NOTE — PROGRESS NOTE ADULT - SUBJECTIVE AND OBJECTIVE BOX
Pt seen and examined f/u hepatic absesses    This morning he feels well with no abdominal pain, nausea or vomiting. The cat scan shows large right pleural effusion, small left effusion. atelectasis, spiculated lesion in lung and 2 small hepatic abscesses with drainage tubes present. Nothing to suggest cholangio carcinoma.    REVIEW OF SYSTEMS:    CONSTITUTIONAL: No fever, weight loss, or fatigue  EYES: No eye pain, visual disturbances, or discharge  ENMT:  No difficulty hearing, tinnitus, vertigo; No sinus or throat pain  RESPIRATORY: No cough, wheezing, chills or hemoptysis; No shortness of breath  CARDIOVASCULAR: No chest pain, palpitations, dizziness, or leg swelling  GASTROINTESTINAL: as above    MEDICATIONS:  MEDICATIONS  (STANDING):  enoxaparin Injectable 40 milliGRAM(s) SubCutaneous daily  influenza   Vaccine 0.5 milliLiter(s) IntraMuscular once  lactobacillus acidophilus 1 Tablet(s) Oral two times a day  pantoprazole    Tablet 40 milliGRAM(s) Oral before breakfast  piperacillin/tazobactam IVPB.. 3.375 Gram(s) IV Intermittent every 8 hours  simvastatin 20 milliGRAM(s) Oral at bedtime  venlafaxine XR. 150 milliGRAM(s) Oral daily    MEDICATIONS  (PRN):  ALPRAZolam 0.25 milliGRAM(s) Oral three times a day PRN anxiety  melatonin 3 milliGRAM(s) Oral at bedtime PRN Insomnia  traMADol 25 milliGRAM(s) Oral three times a day PRN Mild Pain (1 - 3)      Allergies    No Known Allergies    Intolerances        Vital Signs Last 24 Hrs  T(C): 36.6 (18 Dec 2019 00:07), Max: 37.3 (17 Dec 2019 18:20)  T(F): 97.9 (18 Dec 2019 00:07), Max: 99.1 (17 Dec 2019 18:20)  HR: 97 (18 Dec 2019 00:07) (90 - 116)  BP: 139/85 (18 Dec 2019 00:07) (120/84 - 139/85)  BP(mean): --  RR: 18 (18 Dec 2019 00:07) (18 - 19)  SpO2: 95% (18 Dec 2019 00:07) (86% - 99%)    12-17 @ 07:01  -  12-18 @ 07:00  --------------------------------------------------------  IN: 0 mL / OUT: 600 mL / NET: -600 mL        PHYSICAL EXAM:    General: Well developed; well nourished; in no acute distress  HEENT: MMM, conjunctiva and sclera clear  Gastrointestinal:Abdomen: Soft non-tender non-distended; Normal bowel sounds; No hepatosplenomegaly, two drains present RUQ laterally.  Extremities: no cyanosis, clubbing or edema.  Skin: Warm and dry. No obvious rash    LABS:      CBC Full  -  ( 17 Dec 2019 16:22 )  WBC Count : 16.81 K/uL  RBC Count : 3.22 M/uL  Hemoglobin : 8.6 g/dL  Hematocrit : 28.2 %  Platelet Count - Automated : 356 K/uL  Mean Cell Volume : 87.6 fl  Mean Cell Hemoglobin : 26.7 pg  Mean Cell Hemoglobin Concentration : 30.5 gm/dL  Auto Neutrophil # : 11.54 K/uL  Auto Lymphocyte # : 3.31 K/uL  Auto Monocyte # : 1.29 K/uL  Auto Eosinophil # : 0.48 K/uL  Auto Basophil # : 0.03 K/uL  Auto Neutrophil % : 68.5 %  Auto Lymphocyte % : 19.7 %  Auto Monocyte % : 7.7 %  Auto Eosinophil % : 2.9 %  Auto Basophil % : 0.2 %    12-17    133<L>  |  97<L>  |  11.0  ----------------------------<  88  4.5   |  23.0  |  0.65    Ca    9.3      17 Dec 2019 16:22    TPro  6.9  /  Alb  2.8<L>  /  TBili  0.2<L>  /  DBili  x   /  AST  19  /  ALT  15  /  AlkPhos  130<H>  12-17    PT/INR - ( 17 Dec 2019 16:21 )   PT: 14.4 sec;   INR: 1.24 ratio         PTT - ( 17 Dec 2019 16:21 )  PTT:32.9 sec                  RADIOLOGY & ADDITIONAL STUDIES (The following images were personally reviewed):  < from: CT Chest w/ IV Cont (12.17.19 @ 20:49) >  *****PRELIMINARY REPORT******    ******PRELIMINARY REPORT******           EXAM:  CT CHEST IC                          PROCEDURE DATE:  12/17/2019        ******PRELIMINARY REPORT******    ******PRELIMINARY REPORT******          INTERPRETATION:  [facility] RADIOLOGIST PRELIMINARY REPORT    PROCEDURE INFORMATION:   Exam: CT Chest With Contrast   Exam date and time: 12/17/2019 8:34 PM   Age: 64 years old   Clinical indication: Abdominal pain and other: Liver abscess; Acute;   Chest   pain; Other: Lung lesions     TECHNIQUE:   Imaging protocol: Computed tomography of the chest with intravenous   contrast.   3D rendering: MIP and/or 3D reconstructed images were created by the   technologist.   Radiation optimization: All CT scans at this facility use at least one of   these   dose optimization techniques: automated exposure control; mA and/or kV   adjustment per patient size (includes targeted exams where dose is   matched to   clinical indication); or iterative reconstruction.   Contrast material: OMNI 300; Contrast volume: 94 ml; Contrast route: IV;      COMPARISON:   No relevant prior studies available.     FINDINGS:   Lungs: Consolidation in the right lower lobe and left lower lobe may   represent   atelectasis or pneumonia. 9.2 mm the spiculated nodule in the medial   aspect of   the left upper lobe. Series 4, image 11.   Pleural space: Large right pleural effusion. Smaller left pleural   effusion.   Heart: Coronary artery calcifications may indicate coronary artery   disease.   Aorta: Unremarkable. No aortic aneurysm.   Lymph nodes: Unremarkable. No enlarged lymph nodes.   Bones/joints: Well corticated cyst in the right humeral head. Severe   degenerative changes in the right glenohumeral joint   Soft tissues: Unremarkable.     IMPRESSION:   1. Large right pleural effusion. Smaller left pleural effusion.   2. Consolidation in the right lower lobe and left lower lobe may   represent   atelectasis or pneumonia.       =========================  PROCEDURE INFORMATION:   Exam: CT Abdomen With Contrast   Exam date and time: 12/17/2019 8:34 PM   Age: 64 years old   Clinical indication: Abdominal pain and other: Liver abscess; Acute;   Chest   pain; Other: Lung lesions     TECHNIQUE:   Imaging protocol: Computed tomography imagesof the abdomen with   intravenous   contrast.   3D rendering: MIP and/or 3D reconstructed images were created by the   technologist.   Radiation optimization: All CT scans at this facility use at least one of   these   dose optimization techniques: automated exposure control; mA and/or kV   adjustment per patient size (includes targeted exams where dose is   matched to   clinical indication); or iterative reconstruction.   Contrast material: OMNI 300; Contrast volume: 94 ml; Contrast route: IV;      COMPARISON:   No relevant prior studies available.     FINDINGS:   Liver: Drainage tube terminates small fluid collection in the medial   right lobe   of the liver. Fluid collection measures 3.8 x 1.8 cm. Series 4. Image 87.   Additional joint agetube in a 2nd fluid collection posteriorly in the   right   lobe of the liver. Image 98. It measures about 2.8 by 1.1 cm..   Gallbladder and bile ducts: Gallstones in the gallbladder..   Pancreas: Normal. No ductal dilation.   Spleen: Normal. No splenomegaly.   Adrenals: Left adrenal nodule 1.9 by 1.2 cm not clearly adrenal adenoma   Kidneys and ureters: Nonobstructing renal calculi bilaterally. Two cm   left   renal cyst   Stomach and bowel: Constipation in the rectosigmoid Constipation in the   rectosigmoid. Diverticulosis of the rectosigmoid. No celia diverticulitis    3.6   cm collection of air projecting off of the 3rd duodenum. Most likely   represents   duodenal diverticulum., Less likely duodenal ulcer.     Intraperitoneal space:   . No free air no fluid collection  Lymph nodes: Unremarkable. No enlarged lymph nodes.   Vasculature: Unremarkable. No abdominal aortic aneurysm.   Bones/joints: Unremarkable.No acute fracture. No dislocation.   Soft tissues: Unremarkable.     IMPRESSION:   1. Drainage tube terminates small fluid collection in the medial right   lobe of   the liver. Fluid collection measures 3.8 x 1.8 cm. Series 4. Image 87.   Additional joint age tube in a 2nd fluid collection posteriorly in the   right   lobe of the liver. Image 98. It measures about 2.8 by 1.1 cm..   2. Gallstones in the gallbladder..   3. 3.6 cm collection of air projecting off of the 3rd duodenum. Most   likely   represents duodenal diverticulum., Less likely duodenal ulcer.          ******PRELIMINARY REPORT******    ******PRELIMINARY REPORT******              ANNABEL HEARN M.D.;[facility] RADIOLOGIST                    < end of copied text >

## 2019-12-18 NOTE — CONSULT NOTE ADULT - SUBJECTIVE AND OBJECTIVE BOX
[facility]  INFECTIOUS DISEASES AND INTERNAL MEDICINE at [place]  =======================================================  David Dumont MD  Diplomates American Board of Internal Medicine and Infectious Diseases  Tel: [phone]      Fax: [phone]  =======================================================      [id number]  SHIN DEGROOT is a 64y  Male     CC: Patient is a 64y old  Male who presents with a chief complaint of     Past Medical & Surgical Hx:  Cholesterol depletion  Depression      Social Hx:  former smoker, current alcohol use, current marijuana use      FAMILY HISTORY:  Mother  of Leukemia       Allergies  No Known Allergies      Antibiotics:  piperacillin/tazobactam IVPB.. 3.375 Gram(s) IV Intermittent every 8 hours       REVIEW OF SYSTEMS:  CONSTITUTIONAL:  No Fever or chills  HEENT:  No diplopia or blurred vision.  No earache, sore throat or runny nose.  CARDIOVASCULAR:  No pressure, squeezing, strangling, tightness, heaviness or aching about the chest, neck, axilla or epigastrium.  RESPIRATORY:  No cough, shortness of breath  GASTROINTESTINAL:  No nausea, vomiting or diarrhea.  GENITOURINARY:  No dysuria, frequency or urgency. No Blood in urine  MUSCULOSKELETAL:  no joint aches, no muscle pain  SKIN:  No change in skin, hair or nails.  NEUROLOGIC:  No Headaches, seizures or weakness.  PSYCHIATRIC:  No disorder of thought or mood.  ENDOCRINE:  No heat or cold intolerance  HEMATOLOGICAL:  No easy bruising or bleeding.       Physical Exam:  Vital Signs Last 24 Hrs  T(C): 36.6 (18 Dec 2019 00:07), Max: 37.3 (17 Dec 2019 18:20)  T(F): 97.9 (18 Dec 2019 00:07), Max: 99.1 (17 Dec 2019 18:20)  HR: 97 (18 Dec 2019 00:07) (90 - 116)  BP: 139/85 (18 Dec 2019 00:07) (120/84 - 139/85)  RR: 18 (18 Dec 2019 00:07) (18 - 19)  SpO2: 95% (18 Dec 2019 00:07) (86% - 99%)  Weight (kg): 81.6 ( @ 14:51)      GEN: NAD, pleasant  HEENT: normocephalic and atraumatic. EOMI. PERRL.  Anicteric  NECK: Supple.   LUNGS: Clear to auscultation.  HEART: Regular rate and rhythm .  ABDOMEN: Soft, nontender, and nondistended.  Positive bowel sounds.    : No CVA tenderness  EXTREMITIES: Without any edema.  MSK: No joint swelling  NEUROLOGIC: No Focal Deficits  PSYCHIATRIC: Appropriate affect .  SKIN: No Rash      Labs:      133<L>  |  97<L>  |  11.0  ----------------------------<  88  4.5   |  23.0  |  0.65    Ca    9.3      17 Dec 2019 16:22    TPro  6.9  /  Alb  2.8<L>  /  TBili  0.2<L>  /  DBili  x   /  AST  19  /  ALT  15  /  AlkPhos  130<H>                            8.6    16.81 )-----------( 356      ( 17 Dec 2019 16:22 )             28.2       PT/INR - ( 17 Dec 2019 16:21 )   PT: 14.4 sec;   INR: 1.24 ratio         PTT - ( 17 Dec 2019 16:21 )  PTT:32.9 sec    LIVER FUNCTIONS - ( 17 Dec 2019 16:22 )  Alb: 2.8 g/dL / Pro: 6.9 g/dL / ALK PHOS: 130 U/L / ALT: 15 U/L / AST: 19 U/L / GGT: x               RECENT CULTURES:  19 Body fluid culture from [facility]  Gram negative rods (Anaerobic) - Limited growth and not able to be identify     Blood cultures at [facility] negative        < from: CT Chest w/ IV Cont (19 @ 20:49) >  EXAM:  CT CHEST IC                          PROCEDURE DATE:  2019        ******PRELIMINARY REPORT******           INTERPRETATION:  [facility] RADIOLOGIST PRELIMINARY REPORT    PROCEDURE INFORMATION:   Exam: CT Chest With Contrast   Exam date and time: 2019 8:34 PM   Age: 64 years old   Clinical indication: Abdominal pain and other: Liver abscess; Acute;   Chest   pain; Other: Lung lesions     TECHNIQUE:   Imaging protocol: Computed tomography of the chest with intravenous   contrast.   3D rendering: MIP and/or 3D reconstructed images were created by the   technologist.   Radiation optimization: All CT scans at this facility use at least one of   these   dose optimization techniques: automated exposure control; mA and/or kV   adjustment per patient size (includes targeted exams where dose is   matched to   clinical indication); or iterative reconstruction.   Contrast material: OMNI 300; Contrast volume: 94 ml; Contrast route: IV;      COMPARISON:   No relevant prior studies available.     FINDINGS:   Lungs: Consolidation in the right lower lobe and left lower lobe may   represent   atelectasis or pneumonia. 9.2 mm the spiculated nodule in the medial   aspect of   the left upper lobe. Series 4, image 11.   Pleural space: Large right pleural effusion. Smaller left pleural   effusion.   Heart: Coronary artery calcifications may indicate coronary artery   disease.   Aorta: Unremarkable. No aortic aneurysm.   Lymph nodes: Unremarkable. No enlarged lymph nodes.   Bones/joints: Well corticated cyst in the right humeral head. Severe   degenerative changes in the right glenohumeral joint   Soft tissues: Unremarkable.     IMPRESSION:   1. Large right pleural effusion. Smaller left pleural effusion.   2. Consolidation in the right lower lobe and left lower lobe may   represent   atelectasis or pneumonia.       =========================  ******PRELIMINARY REPORT******     PROCEDURE INFORMATION:   Exam: CT Abdomen With Contrast   Exam date and time: 2019 8:34 PM   Age: 64 years old   Clinical indication: Abdominal pain and other: Liver abscess; Acute;   Chest   pain; Other: Lung lesions     TECHNIQUE:   Imaging protocol: Computed tomography imagesof the abdomen with   intravenous   contrast.   3D rendering: MIP and/or 3D reconstructed images were created by the   technologist.   Radiation optimization: All CT scans at this facility use at least one of   these   dose optimization techniques: automated exposure control; mA and/or kV   adjustment per patient size (includes targeted exams where dose is   matched to   clinical indication); or iterative reconstruction.   Contrast material: OMNI 300; Contrast volume: 94 ml; Contrast route: IV;      COMPARISON:   No relevant prior studies available.     FINDINGS:   Liver: Drainage tube terminates small fluid collection in the medial   right lobe   of the liver. Fluid collection measures 3.8 x 1.8 cm. Series 4. Image 87.   Additional joint agetube in a 2nd fluid collection posteriorly in the   right   lobe of the liver. Image 98. It measures about 2.8 by 1.1 cm..   Gallbladder and bile ducts: Gallstones in the gallbladder..   Pancreas: Normal. No ductal dilation.   Spleen: Normal. No splenomegaly.   Adrenals: Left adrenal nodule 1.9 by 1.2 cm not clearly adrenal adenoma   Kidneys and ureters: Nonobstructing renal calculi bilaterally. Two cm   left   renal cyst   Stomach and bowel: Constipation in the rectosigmoid Constipation in the   rectosigmoid. Diverticulosis of the rectosigmoid. No celia diverticulitis    3.6   cm collection of air projecting off of the 3rd duodenum. Most likely   represents   duodenal diverticulum., Less likely duodenal ulcer.     Intraperitoneal space:   . No free air no fluid collection  Lymph nodes: Unremarkable. No enlarged lymph nodes.   Vasculature: Unremarkable. No abdominal aortic aneurysm.   Bones/joints: Unremarkable.No acute fracture. No dislocation.   Soft tissues: Unremarkable.     IMPRESSION:   1. Drainage tube terminates small fluid collection in the medial right   lobe of   the liver. Fluid collection measures 3.8 x 1.8 cm. Series 4. Image 87.   Additional joint age tube in a 2nd fluid collection posteriorly in the   right   lobe of the liver. Image 98. It measures about 2.8 by 1.1 cm..   2. Gallstones in the gallbladder..   3. 3.6 cm collection of air projecting off of the 3rd duodenum. Most   likely   represents duodenal diverticulum., Less likely duodenal ulcer.    < end of copied text > [facility]  INFECTIOUS DISEASES AND INTERNAL MEDICINE at [place]  =======================================================  David Dumont MD  Diplomates American Board of Internal Medicine and Infectious Diseases  Tel: [phone]      Fax: [phone]  =======================================================      N-[id number]  SHIN DEGROOT     CC: Patient is a 64y old  Male who presents with a chief complaint of Liver abscess    63y/o  Male  who was transferred from [facility] to [facility]. [place] [facility] [place], [place], leukocytosis, was thought to have pneumonia and was also found to have a liver lesion. He had blood cultures which were negative. IR drain was placed which showed liver abscess. Cultures from the abscess with GNR anaerobic. He was maintained on Vancomycin and Zosyn at [facility] patient has been afebrile. Had repeat imaging. Seen by GI. Maintained on Zosyn. ID input requested.       Past Medical & Surgical Hx:  Cholesterol depletion  Depression      Social Hx:  former smoker, current alcohol use, current marijuana use      FAMILY HISTORY:  Mother  of Leukemia       Allergies  No Known Allergies      Antibiotics:  piperacillin/tazobactam IVPB.. 3.375 Gram(s) IV Intermittent every 8 hours       REVIEW OF SYSTEMS:  CONSTITUTIONAL:  No Fever or chills  HEENT:  No diplopia or blurred vision.  No earache, sore throat or runny nose.  CARDIOVASCULAR:  No pressure, squeezing, strangling, tightness, heaviness or aching about the chest, neck, axilla or epigastrium.  RESPIRATORY:  No cough, shortness of breath  GASTROINTESTINAL:  No nausea, vomiting or diarrhea.  GENITOURINARY:  No dysuria, frequency or urgency.   MUSCULOSKELETAL:  no joint aches, no muscle pain  SKIN:  No change in skin, hair or nails.  NEUROLOGIC:  No Headaches, seizures or weakness.  PSYCHIATRIC:  No disorder of thought or mood.  ENDOCRINE:  No heat or cold intolerance  HEMATOLOGICAL:  No easy bruising or bleeding.       Physical Exam:  Vital Signs Last 24 Hrs  T(C): 36.6 (18 Dec 2019 00:07), Max: 37.3 (17 Dec 2019 18:20)  T(F): 97.9 (18 Dec 2019 00:07), Max: 99.1 (17 Dec 2019 18:20)  HR: 97 (18 Dec 2019 00:07) (90 - 116)  BP: 139/85 (18 Dec 2019 00:07) (120/84 - 139/85)  RR: 18 (18 Dec 2019 00:07) (18 - 19)  SpO2: 95% (18 Dec 2019 00:07) (86% - 99%)  Weight (kg): 81.6 ( @ 14:51)      GEN: NAD, pleasant  HEENT: normocephalic and atraumatic. EOMI. PERRL.  Anicteric  NECK: Supple.   LUNGS: Clear to auscultation.  HEART: Regular rate and rhythm .  ABDOMEN: Soft, nontender, and nondistended.  Positive bowel sounds.  + Drain   : No CVA tenderness  EXTREMITIES: Without any edema.  MSK: No joint swelling  NEUROLOGIC: No Focal Deficits  PSYCHIATRIC: Appropriate affect .  SKIN: No Rash      Labs:      133<L>  |  97<L>  |  11.0  ----------------------------<  88  4.5   |  23.0  |  0.65    Ca    9.3      17 Dec 2019 16:22    TPro  6.9  /  Alb  2.8<L>  /  TBili  0.2<L>  /  DBili  x   /  AST  19  /  ALT  15  /  AlkPhos  130<H>                            8.6    16.81 )-----------( 356      ( 17 Dec 2019 16:22 )             28.2       PT/INR - ( 17 Dec 2019 16:21 )   PT: 14.4 sec;   INR: 1.24 ratio         PTT - ( 17 Dec 2019 16:21 )  PTT:32.9 sec    LIVER FUNCTIONS - ( 17 Dec 2019 16:22 )  Alb: 2.8 g/dL / Pro: 6.9 g/dL / ALK PHOS: 130 U/L / ALT: 15 U/L / AST: 19 U/L / GGT: x               RECENT CULTURES:  19 Body fluid culture from [facility]  Gram negative rods (Anaerobic) - Limited growth and not able to be identify     Blood cultures at [facility] negative        < from: CT Chest w/ IV Cont (19 @ 20:49) >  EXAM:  CT CHEST IC                          PROCEDURE DATE:  2019        ******PRELIMINARY REPORT******           INTERPRETATION:  [facility] RADIOLOGIST PRELIMINARY REPORT    PROCEDURE INFORMATION:   Exam: CT Chest With Contrast   Exam date and time: 2019 8:34 PM   Age: 64 years old   Clinical indication: Abdominal pain and other: Liver abscess; Acute;   Chest   pain; Other: Lung lesions     TECHNIQUE:   Imaging protocol: Computed tomography of the chest with intravenous   contrast.   3D rendering: MIP and/or 3D reconstructed images were created by the   technologist.   Radiation optimization: All CT scans at this facility use at least one of   these   dose optimization techniques: automated exposure control; mA and/or kV   adjustment per patient size (includes targeted exams where dose is   matched to   clinical indication); or iterative reconstruction.   Contrast material: OMNI 300; Contrast volume: 94 ml; Contrast route: IV;      COMPARISON:   No relevant prior studies available.     FINDINGS:   Lungs: Consolidation in the right lower lobe and left lower lobe may   represent   atelectasis or pneumonia. 9.2 mm the spiculated nodule in the medial   aspect of   the left upper lobe. Series 4, image 11.   Pleural space: Large right pleural effusion. Smaller left pleural   effusion.   Heart: Coronary artery calcifications may indicate coronary artery   disease.   Aorta: Unremarkable. No aortic aneurysm.   Lymph nodes: Unremarkable. No enlarged lymph nodes.   Bones/joints: Well corticated cyst in the right humeral head. Severe   degenerative changes in the right glenohumeral joint   Soft tissues: Unremarkable.     IMPRESSION:   1. Large right pleural effusion. Smaller left pleural effusion.   2. Consolidation in the right lower lobe and left lower lobe may   represent   atelectasis or pneumonia.       =========================  ******PRELIMINARY REPORT******     PROCEDURE INFORMATION:   Exam: CT Abdomen With Contrast   Exam date and time: 2019 8:34 PM   Age: 64 years old   Clinical indication: Abdominal pain and other: Liver abscess; Acute;   Chest   pain; Other: Lung lesions     TECHNIQUE:   Imaging protocol: Computed tomography imagesof the abdomen with   intravenous   contrast.   3D rendering: MIP and/or 3D reconstructed images were created by the   technologist.   Radiation optimization: All CT scans at this facility use at least one of   these   dose optimization techniques: automated exposure control; mA and/or kV   adjustment per patient size (includes targeted exams where dose is   matched to   clinical indication); or iterative reconstruction.   Contrast material: OMNI 300; Contrast volume: 94 ml; Contrast route: IV;      COMPARISON:   No relevant prior studies available.     FINDINGS:   Liver: Drainage tube terminates small fluid collection in the medial   right lobe   of the liver. Fluid collection measures 3.8 x 1.8 cm. Series 4. Image 87.   Additional joint agetube in a 2nd fluid collection posteriorly in the   right   lobe of the liver. Image 98. It measures about 2.8 by 1.1 cm..   Gallbladder and bile ducts: Gallstones in the gallbladder..   Pancreas: Normal. No ductal dilation.   Spleen: Normal. No splenomegaly.   Adrenals: Left adrenal nodule 1.9 by 1.2 cm not clearly adrenal adenoma   Kidneys and ureters: Nonobstructing renal calculi bilaterally. Two cm   left   renal cyst   Stomach and bowel: Constipation in the rectosigmoid Constipation in the   rectosigmoid. Diverticulosis of the rectosigmoid. No celia diverticulitis    3.6   cm collection of air projecting off of the 3rd duodenum. Most likely   represents   duodenal diverticulum., Less likely duodenal ulcer.     Intraperitoneal space:   . No free air no fluid collection  Lymph nodes: Unremarkable. No enlarged lymph nodes.   Vasculature: Unremarkable. No abdominal aortic aneurysm.   Bones/joints: Unremarkable.No acute fracture. No dislocation.   Soft tissues: Unremarkable.     IMPRESSION:   1. Drainage tube terminates small fluid collection in the medial right   lobe of   the liver. Fluid collection measures 3.8 x 1.8 cm. Series 4. Image 87.   Additional joint age tube in a 2nd fluid collection posteriorly in the   right   lobe of the liver. Image 98. It measures about 2.8 by 1.1 cm..   2. Gallstones in the gallbladder..   3. 3.6 cm collection of air projecting off of the 3rd duodenum. Most   likely   represents duodenal diverticulum., Less likely duodenal ulcer.    < end of copied text >

## 2019-12-19 DIAGNOSIS — J90 PLEURAL EFFUSION, NOT ELSEWHERE CLASSIFIED: ICD-10-CM

## 2019-12-19 DIAGNOSIS — R91.1 SOLITARY PULMONARY NODULE: ICD-10-CM

## 2019-12-19 LAB
ALBUMIN SERPL ELPH-MCNC: 3.2 G/DL — LOW (ref 3.3–5.2)
ALP SERPL-CCNC: 127 U/L — HIGH (ref 40–120)
ALT FLD-CCNC: 18 U/L — SIGNIFICANT CHANGE UP
ANION GAP SERPL CALC-SCNC: 14 MMOL/L — SIGNIFICANT CHANGE UP (ref 5–17)
AST SERPL-CCNC: 18 U/L — SIGNIFICANT CHANGE UP
B PERT IGG+IGM PNL SER: ABNORMAL
BASOPHILS # BLD AUTO: 0.08 K/UL — SIGNIFICANT CHANGE UP (ref 0–0.2)
BASOPHILS NFR BLD AUTO: 0.5 % — SIGNIFICANT CHANGE UP (ref 0–2)
BILIRUB DIRECT SERPL-MCNC: 0.1 MG/DL — SIGNIFICANT CHANGE UP (ref 0–0.3)
BILIRUB INDIRECT FLD-MCNC: 0.3 MG/DL — SIGNIFICANT CHANGE UP (ref 0.2–1)
BILIRUB SERPL-MCNC: 0.4 MG/DL — SIGNIFICANT CHANGE UP (ref 0.4–2)
BUN SERPL-MCNC: 9 MG/DL — SIGNIFICANT CHANGE UP (ref 8–20)
CALCIUM SERPL-MCNC: 9.6 MG/DL — SIGNIFICANT CHANGE UP (ref 8.6–10.2)
CHLORIDE SERPL-SCNC: 100 MMOL/L — SIGNIFICANT CHANGE UP (ref 98–107)
CO2 SERPL-SCNC: 23 MMOL/L — SIGNIFICANT CHANGE UP (ref 22–29)
COLOR FLD: ABNORMAL
COMMENT - FLUIDS: SIGNIFICANT CHANGE UP
COMMENT - FLUIDS: SIGNIFICANT CHANGE UP
CREAT SERPL-MCNC: 0.86 MG/DL — SIGNIFICANT CHANGE UP (ref 0.5–1.3)
EOSINOPHIL # BLD AUTO: 0.55 K/UL — HIGH (ref 0–0.5)
EOSINOPHIL NFR BLD AUTO: 3.3 % — SIGNIFICANT CHANGE UP (ref 0–6)
FLUID INTAKE SUBSTANCE CLASS: SIGNIFICANT CHANGE UP
FLUID SEGMENTED GRANULOCYTES: SIGNIFICANT CHANGE UP %
GLUCOSE SERPL-MCNC: 142 MG/DL — HIGH (ref 70–115)
GRAM STN FLD: SIGNIFICANT CHANGE UP
HCT VFR BLD CALC: 33.2 % — LOW (ref 39–50)
HGB BLD-MCNC: 10.1 G/DL — LOW (ref 13–17)
IMM GRANULOCYTES NFR BLD AUTO: 1.2 % — SIGNIFICANT CHANGE UP (ref 0–1.5)
LYMPHOCYTES # BLD AUTO: 27 % — SIGNIFICANT CHANGE UP (ref 13–44)
LYMPHOCYTES # BLD AUTO: 4.53 K/UL — HIGH (ref 1–3.3)
MCHC RBC-ENTMCNC: 26.8 PG — LOW (ref 27–34)
MCHC RBC-ENTMCNC: 30.4 GM/DL — LOW (ref 32–36)
MCV RBC AUTO: 88.1 FL — SIGNIFICANT CHANGE UP (ref 80–100)
MONOCYTES # BLD AUTO: 1.49 K/UL — HIGH (ref 0–0.9)
MONOCYTES NFR BLD AUTO: 8.9 % — SIGNIFICANT CHANGE UP (ref 2–14)
NEUTROPHILS # BLD AUTO: 9.92 K/UL — HIGH (ref 1.8–7.4)
NEUTROPHILS NFR BLD AUTO: 59.1 % — SIGNIFICANT CHANGE UP (ref 43–77)
PH FLD: 8 — SIGNIFICANT CHANGE UP
PLATELET # BLD AUTO: 517 K/UL — HIGH (ref 150–400)
POTASSIUM SERPL-MCNC: 4.6 MMOL/L — SIGNIFICANT CHANGE UP (ref 3.5–5.3)
POTASSIUM SERPL-SCNC: 4.6 MMOL/L — SIGNIFICANT CHANGE UP (ref 3.5–5.3)
PROT SERPL-MCNC: 7.8 G/DL — SIGNIFICANT CHANGE UP (ref 6.6–8.7)
RBC # BLD: 3.77 M/UL — LOW (ref 4.2–5.8)
RBC # FLD: 17.2 % — HIGH (ref 10.3–14.5)
RCV VOL RI: 460 /UL — HIGH (ref 0–0)
SODIUM SERPL-SCNC: 137 MMOL/L — SIGNIFICANT CHANGE UP (ref 135–145)
SPECIMEN SOURCE: SIGNIFICANT CHANGE UP
TOTAL NUCLEATED CELL COUNT, BODY FLUID: 43 /UL — SIGNIFICANT CHANGE UP
TUBE TYPE: SIGNIFICANT CHANGE UP
WBC # BLD: 16.77 K/UL — HIGH (ref 3.8–10.5)
WBC # FLD AUTO: 16.77 K/UL — HIGH (ref 3.8–10.5)

## 2019-12-19 PROCEDURE — 99223 1ST HOSP IP/OBS HIGH 75: CPT

## 2019-12-19 PROCEDURE — 99233 SBSQ HOSP IP/OBS HIGH 50: CPT

## 2019-12-19 PROCEDURE — 32557 INSERT CATH PLEURA W/ IMAGE: CPT | Mod: RT

## 2019-12-19 PROCEDURE — 99253 IP/OBS CNSLTJ NEW/EST LOW 45: CPT

## 2019-12-19 PROCEDURE — 71045 X-RAY EXAM CHEST 1 VIEW: CPT | Mod: 26,76

## 2019-12-19 PROCEDURE — 99232 SBSQ HOSP IP/OBS MODERATE 35: CPT

## 2019-12-19 RX ORDER — ENOXAPARIN SODIUM 100 MG/ML
40 INJECTION SUBCUTANEOUS DAILY
Refills: 0 | Status: DISCONTINUED | OUTPATIENT
Start: 2019-12-21 | End: 2019-12-24

## 2019-12-19 RX ADMIN — OXYCODONE HYDROCHLORIDE 5 MILLIGRAM(S): 5 TABLET ORAL at 20:23

## 2019-12-19 RX ADMIN — Medication 0.25 MILLIGRAM(S): at 21:31

## 2019-12-19 RX ADMIN — PIPERACILLIN AND TAZOBACTAM 25 GRAM(S): 4; .5 INJECTION, POWDER, LYOPHILIZED, FOR SOLUTION INTRAVENOUS at 04:59

## 2019-12-19 RX ADMIN — Medication 1 TABLET(S): at 05:00

## 2019-12-19 RX ADMIN — Medication 3 MILLIGRAM(S): at 21:31

## 2019-12-19 RX ADMIN — OXYCODONE HYDROCHLORIDE 5 MILLIGRAM(S): 5 TABLET ORAL at 20:55

## 2019-12-19 RX ADMIN — Medication 1 TABLET(S): at 18:20

## 2019-12-19 RX ADMIN — PANTOPRAZOLE SODIUM 40 MILLIGRAM(S): 20 TABLET, DELAYED RELEASE ORAL at 05:00

## 2019-12-19 RX ADMIN — PIPERACILLIN AND TAZOBACTAM 25 GRAM(S): 4; .5 INJECTION, POWDER, LYOPHILIZED, FOR SOLUTION INTRAVENOUS at 18:19

## 2019-12-19 RX ADMIN — SIMVASTATIN 20 MILLIGRAM(S): 20 TABLET, FILM COATED ORAL at 20:23

## 2019-12-19 RX ADMIN — OXYCODONE HYDROCHLORIDE 5 MILLIGRAM(S): 5 TABLET ORAL at 05:00

## 2019-12-19 RX ADMIN — OXYCODONE HYDROCHLORIDE 5 MILLIGRAM(S): 5 TABLET ORAL at 06:00

## 2019-12-19 RX ADMIN — Medication 150 MILLIGRAM(S): at 12:11

## 2019-12-19 RX ADMIN — Medication 0.25 MILLIGRAM(S): at 12:11

## 2019-12-19 NOTE — PROGRESS NOTE ADULT - ASSESSMENT
Patient is a 64 year old with PMH of Depression, HLD who was admitted to [facility] for liver abscess since 12/4/19 and transferred to [facility] for w/u and treatment of possible Cholangiocarcinoma.    1) Liver abscesses   - Fluid c/s from [facility] 12/6/19 Gram neg rods; and blood c/s from 12/4/19 negative from [facility]  - s/p IR guided drainage at [facility] on 12/6/19  - Worsening leukocytosis noted; ID on board  - Continue Zosyn until 12/20  - MRI/MRCP ordered per GI  - tumor markers negative   - ID Consult appreciated     2) Lung Nodules and Right Pleural Effusion  - No respiratory distress; O2 stable on room air  - s/p IR guided drainage; chest tube placed  - f/u pleural fluid studies  - Pulmonary Consult appreciated    4) Anemia  - Likely chronic   - iron studies reviewed; ferritin > 1800  - hold venofer    5) HLD  - Continue Simvastatin    6) Depression  - Continue Venlafaxin     7) HLD  -continue statin    8) GERD  -continue PPI    9) Anxiety/Depression  -Continue Xanax and Effexor     DVT Prophylaxis - Lovenox SC

## 2019-12-19 NOTE — CONSULT NOTE ADULT - ASSESSMENT
64 yr old with PMH of ROSELINE on CPAP at home, Depression, anxiety, HLD; found to have hepatic abscesses and right loculated pleural effusion s/p IR R PTC 12/19. Noted to have MATTHEW nodule (1.1 cm), decreasing in size since since earlier this month. Pleural fluid cultures, chem, and cyto pending.       Shannan LORD  Thoracic Surgery  #[phone]

## 2019-12-19 NOTE — PROGRESS NOTE ADULT - SUBJECTIVE AND OBJECTIVE BOX
Vascular & Interventional Radiology Post-Procedure Note    Pre-Procedure Diagnosis:  Right pleural effusion.  Post-Procedure Diagnosis: Same as pre.  Indications for Procedure:  Right pleural effusion.    : MD Marika  Assistant(s):    Procedure Details/Findings:   12 fr R chest tube placement.    samples sents    Complications: none   Estimated Blood Loss: Minimal  Specimen:  right pleural fluid.  Contrast:  none  Sedation:  none  Patient Condition/Disposition:  stable, floor  Plan:   - f/u labs  - chest tube to water seal for now. Chest tube management per pulmonology/thoracic surgery.

## 2019-12-19 NOTE — PROGRESS NOTE ADULT - SUBJECTIVE AND OBJECTIVE BOX
Pt seen and examined f/u liver abscesses    This morning he feels well with no complaints. Tolerating diet and afebrile. Cultures form abscesses reveal anerobic bacteria. LFTs now normal. Ferritin is high but is an acute phase reactant and other iron studies normal so no hemachromatosis.  Tumor markers normal.  REVIEW OF SYSTEMS:    CONSTITUTIONAL: No fever, weight loss, or fatigue  EYES: No eye pain, visual disturbances, or discharge  ENMT:  No difficulty hearing, tinnitus, vertigo; No sinus or throat pain  RESPIRATORY: No cough, wheezing, chills or hemoptysis; No shortness of breath  CARDIOVASCULAR: No chest pain, palpitations, dizziness, or leg swelling  GASTROINTESTINAL: No abdominal or epigastric pain. No nausea, vomiting, or hematemesis; No diarrhea or constipation. No melena or hematochezia.    MEDICATIONS:  MEDICATIONS  (STANDING):  enoxaparin Injectable 40 milliGRAM(s) SubCutaneous daily  influenza   Vaccine 0.5 milliLiter(s) IntraMuscular once  iron sucrose IVPB 200 milliGRAM(s) IV Intermittent every 24 hours  lactobacillus acidophilus 1 Tablet(s) Oral two times a day  pantoprazole    Tablet 40 milliGRAM(s) Oral before breakfast  piperacillin/tazobactam IVPB.. 3.375 Gram(s) IV Intermittent every 8 hours  simvastatin 20 milliGRAM(s) Oral at bedtime  venlafaxine XR. 150 milliGRAM(s) Oral daily    MEDICATIONS  (PRN):  acetaminophen   Tablet .. 650 milliGRAM(s) Oral every 6 hours PRN Temp greater or equal to 38C (100.4F), Mild Pain (1 - 3), Moderate Pain (4 - 6)  ALPRAZolam 0.25 milliGRAM(s) Oral three times a day PRN anxiety  melatonin 3 milliGRAM(s) Oral at bedtime PRN Insomnia  ondansetron Injectable 4 milliGRAM(s) IV Push every 6 hours PRN Nausea and/or Vomiting  oxyCODONE    IR 5 milliGRAM(s) Oral every 4 hours PRN Severe Pain (7 - 10)      Allergies    No Known Allergies    Intolerances        Vital Signs Last 24 Hrs  T(C): 36.8 (18 Dec 2019 23:59), Max: 36.8 (18 Dec 2019 23:59)  T(F): 98.3 (18 Dec 2019 23:59), Max: 98.3 (18 Dec 2019 23:59)  HR: 82 (18 Dec 2019 23:59) (82 - 98)  BP: 124/81 (18 Dec 2019 23:59) (123/76 - 124/81)  BP(mean): --  RR: 18 (18 Dec 2019 23:59) (18 - 18)  SpO2: 98% (18 Dec 2019 23:59) (97% - 98%)    12-18 @ 07:01  -  12-19 @ 07:00  --------------------------------------------------------  IN: 0 mL / OUT: 820 mL / NET: -820 mL        PHYSICAL EXAM:    General: overweight male in no acute distress  HEENT: MMM, conjunctiva and sclera clear  Gastrointestinal:Abdomen: Soft non-tender non-distended; Normal bowel sounds; No hepatosplenomegaly, drainage tubes present without change  Extremities: no cyanosis, clubbing or edema.  Skin: Warm and dry. No obvious rash    LABS:      CBC Full  -  ( 18 Dec 2019 09:58 )  WBC Count : 11.14 K/uL  RBC Count : 3.06 M/uL  Hemoglobin : 8.2 g/dL  Hematocrit : 26.8 %  Platelet Count - Automated : 340 K/uL  Mean Cell Volume : 87.6 fl  Mean Cell Hemoglobin : 26.8 pg  Mean Cell Hemoglobin Concentration : 30.6 gm/dL  Auto Neutrophil # : x  Auto Lymphocyte # : x  Auto Monocyte # : x  Auto Eosinophil # : x  Auto Basophil # : x  Auto Neutrophil % : x  Auto Lymphocyte % : x  Auto Monocyte % : x  Auto Eosinophil % : x  Auto Basophil % : x    12-18    137  |  99  |  8.0  ----------------------------<  179<H>  4.1   |  25.0  |  0.75    Ca    9.2      18 Dec 2019 09:58    TPro  6.5<L>  /  Alb  2.8<L>  /  TBili  0.4  /  DBili  x   /  AST  16  /  ALT  14  /  AlkPhos  115  12-18    PT/INR - ( 17 Dec 2019 16:21 )   PT: 14.4 sec;   INR: 1.24 ratio         PTT - ( 17 Dec 2019 16:21 )  PTT:32.9 sec

## 2019-12-19 NOTE — PROGRESS NOTE ADULT - SUBJECTIVE AND OBJECTIVE BOX
[facility]  INFECTIOUS DISEASES AND INTERNAL MEDICINE at [place]  =======================================================  David Dumont MD  Diplomates American Board of Internal Medicine and Infectious Diseases  Tel: [phone]      Fax: [phone]  =======================================================    SHIN DEGROOT [id number]    Follow up: Hepatic abscess    No fever or chills  no complaints      Allergies:  No Known Allergies      Antibiotics:  piperacillin/tazobactam IVPB.. 3.375 Gram(s) IV Intermittent every 8 hours      REVIEW OF SYSTEMS:  CONSTITUTIONAL:  No Fever or chills  HEENT:  No diplopia or blurred vision.  No earache, sore throat or runny nose.  CARDIOVASCULAR:  No pressure, squeezing, strangling, tightness, heaviness or aching about the chest, neck, axilla or epigastrium.  RESPIRATORY:  No cough, shortness of breath  GASTROINTESTINAL:  No nausea, vomiting or diarrhea.  GENITOURINARY:  No dysuria, frequency or urgency.   MUSCULOSKELETAL:  no joint aches, no muscle pain  SKIN:  No change in skin, hair or nails.  NEUROLOGIC:  No Headaches, seizures or weakness.  PSYCHIATRIC:  No disorder of thought or mood.  ENDOCRINE:  No heat or cold intolerance  HEMATOLOGICAL:  No easy bruising or bleeding.       Physical Exam:  Vital Signs Last 24 Hrs  T(C): 36.8 (18 Dec 2019 23:59), Max: 36.8 (18 Dec 2019 23:59)  T(F): 98.3 (18 Dec 2019 23:59), Max: 98.3 (18 Dec 2019 23:59)  HR: 82 (18 Dec 2019 23:59) (82 - 98)  BP: 124/81 (18 Dec 2019 23:59) (123/76 - 124/81)  RR: 18 (18 Dec 2019 23:59) (18 - 18)  SpO2: 98% (18 Dec 2019 23:59) (97% - 98%)      GEN: NAD, pleasant  HEENT: normocephalic and atraumatic. EOMI. PERRL.  Anicteric  NECK: Supple.   LUNGS: Clear to auscultation.  HEART: Regular rate and rhythm .  ABDOMEN: Soft, nontender, and nondistended.  Positive bowel sounds.  + Drain   : No CVA tenderness  EXTREMITIES: Without any edema.  MSK: No joint swelling  NEUROLOGIC: No Focal Deficits  PSYCHIATRIC: Appropriate affect .  SKIN: No Rash      Labs:  12-18    137  |  99  |  8.0  ----------------------------<  179<H>  4.1   |  25.0  |  0.75    Ca    9.2      18 Dec 2019 09:58    TPro  6.5<L>  /  Alb  2.8<L>  /  TBili  0.4  /  DBili  x   /  AST  16  /  ALT  14  /  AlkPhos  115  12-18                          8.2    11.14 )-----------( 340      ( 18 Dec 2019 09:58 )             26.8       PT/INR - ( 17 Dec 2019 16:21 )   PT: 14.4 sec;   INR: 1.24 ratio         PTT - ( 17 Dec 2019 16:21 )  PTT:32.9 sec    LIVER FUNCTIONS - ( 18 Dec 2019 09:58 )  Alb: 2.8 g/dL / Pro: 6.5 g/dL / ALK PHOS: 115 U/L / ALT: 14 U/L / AST: 16 U/L / GGT: x             RECENT CULTURES:  12/6/19 Body fluid culture from [facility]  Gram negative rods (Anaerobic) - Limited growth and not able to be identify     Blood cultures at [facility] negative      < from: CT Abdomen and Pelvis w/ Oral Cont and w/ IV Cont (12.17.19 @ 20:52) >  EXAM:  CT ABDOMEN AND PELVIS OC IC                         EXAM:  CT CHEST IC                          PROCEDURE DATE:  12/17/2019      INTERPRETATION:  FINAL REPORT:    Addendum created by Lake Bull MD on 12/17/2019 9:34:43 PM EST     Addendum to the chest impression    9.2 mm the spiculated nodule in the medial aspect of the left upper lobe.   Series 4, image 11. For both low risk and high risk patients, consider CT   at 3   months, PET/CT or biopsy. (Lakshmi et al., Fleischner Society, 2017)  Initial report created on 12/17/2019 9:33:41 PM EST     PROCEDURE INFORMATION:   Exam: CT Chest With Contrast   Exam date and time: 12/17/2019 8:34 PM   Age: 64 years old   Clinical indication: Lung lesions.    TECHNIQUE:   Imaging protocol: Computed tomography of the chest with intravenous   contrast.   3D rendering: MIP and/or 3D reconstructed images were created by the   technologist.   Contrast material: OMNI 300; Contrast volume: 94 ml; Contrast route: IV;      COMPARISON:   No relevant prior studies available.     FINDINGS:   Lungs: Compressive atelectasis of the entire right lower lobe and   inferior aspect of the left lower lobe. Compressive atelectasis at the   posterior aspect of the right middle lobe 1.1 x 0.9 cm left upper lobe   pulmonary nodule, previously 1.9 x 1.8 cm on 12/4/2019. Previously seen   right-sided nodules not seen on the current study, however could be   secondary to the atelectasis in the current study.  Pleural space: Moderate partially loculated right pleural effusion and   small left pleural effusion.  Heart: Normal size. No pericardial effusion.  Vessels: Thoracic aorta normal in caliber. Coronary artery calcifications.  Mediastinal/hilar: No enlarged lymph nodes.  Chest wall/lower neck: No enlarged axillary lymph nodes.  Bones: Partially imaged fat density lesion in the right humeral head   measuring 1.5 cm, likely an intraosseous lipoma.    IMPRESSION:     Moderate partially loculated right pleural effusion with complete   atelectasis of the right lower lobe.    Small left pleural effusion with atelectasis at the inferior aspect of   the left lower lobe.    1.1 cm left upper lobe pulmonary nodule, decreased in size since   12/4/2019, previously 1.9 cm    Previously seen right-sided pulmonary nodules not seen on the current   study which may be secondary to compressive atelectasis in the right   lower and middle lobes; the pulmonary nodules were likely infectious in   etiology.      =========================  PROCEDURE INFORMATION:   Exam: CT Abdomen With Contrast   Exam date and time: 12/17/2019 8:34 PM   Age: 64 years old   Clinical indication: Abdominal pain and other: Liver abscess; Acute;   Chest   pain; Other: Lung lesions     TECHNIQUE:   Imaging protocol: Computedtomography images of the abdomen with   intravenous   contrast.   3D rendering: MIP and/or 3D reconstructed images were created by the   technologist.   Contrast material: OMNI 300; Contrast volume: 94 ml; Contrast route: IV;      COMPARISON:   CT abdomen/pelvis 12/4/2019 and MRI abdomen 12/10/2019.    FINDINGS:   Liver: 2 drainage catheters in the liver with the more superior catheter   in a collection in the right hepatic lobe measuring 3.3 x 2.1 cm (series   3 image 1:30) and the more inferiorcatheter in a collection measuring   3.2 x 1.4 cm (series 3 image 146). There are additional linear foci of   low attenuation in the right hepatic lobe (series 3 image 140), either   additional areas of fluid or dilated biliary radicles. There additional.  Gallbladder and bile ducts: Cholelithiasis. Also see above.  Pancreas: Normal. No ductal dilation.   Spleen: Enlarged measuring 16.7 cm anteroposterior.  Adrenals: Left adrenal gland thickening. Unremarkable right adrenal gland.  Kidneys and ureters: No hydronephrosis. Bilateral nonobstructing renal   calculi and bilateral renal cysts.    Stomach and bowel: Moderate amount retained fecal material in the colon.   No bowel obstruction. Colonic diverticulosis without evidence of   diverticulitis. Appendix is normal. There is a duodenal diverticulum. Fat   density focus at the anterior aspect of the proximal sigmoid colon   (series 3 image 248) with a high attenuation rim, likely an epiploic   appendage or fat necrosis.    Intraperitoneal space: No ascites or free air  Lymph nodes: No enlarged lymph nodes.  Vessels: Abdominal aorta normal in caliber with mild calcified plaque. No   definite thrombus seen in the IVC.  Bones/joints: No aggressive osseous lesion.  Abdominal wall: Small fat-containing umbilical hernia. Small droplet of   air in the right anterior abdominal wall which may be related to the   placement of the drainage catheters or a subcutaneous injection.    IMPRESSION:     2 drainage catheters in the liver which terminate and fluid collections   measuring 3.3 x 2.1 cm and 3.2 x 1.4 cm.    Additional linear foci of low attenuation in the right hepatic lobe,   either additional areas of fluid or dilated intrahepatic biliary radicles    < end of copied text >

## 2019-12-19 NOTE — CONSULT NOTE ADULT - ASSESSMENT
Pleural effusion may be sympathetic vs metastasis   doubt empyema    Plan:  ir thoracentesis  T surg consult called - Dr Fallon called  further rec to follow above Pleural effusion may be sympathetic vs metastasis   doubt empyema    Plan:  ir thoracentesis with chest tube left in place  T surg consult called - Dr Fallon called  further rec to follow above

## 2019-12-19 NOTE — PROGRESS NOTE ADULT - SUBJECTIVE AND OBJECTIVE BOX
CHIEF COMPLAINT/INTERVAL HISTORY:    Patient is a 64y old  Male who presents with a chief complaint of liver lesion (19 Dec 2019 08:01)      HPI:  64 yr old with PMH of Depression, HLD was complaining of lethargy, wt loss 10 lb, fever and chills for some time. He did not initially pursue any help as his dog was sick. Then he went to his PCP and noted to have abnormal labs. He went to [facility] on 19. Was noted to have Leucocytosis, thrombocytosis, Ct imaging showing Rt hepatic lobe massess, pulm nodules concerning for malig/ metastasis, Cholelithiasis without cholecystitis. Hepatic abscess suspected and 2 drains inserted by IR. Fluid c/s from 19 positive for Gram neg rods. Blood c/s neg to date. Was treated with Vanc/ Zosyn/ Flagyl. 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 Tumor markers normal.   Denies fever/ pain/ n/v    SH- former smoker, current alcohol use, current marijuana use  FH- M-  - leukemia; F- alive (17 Dec 2019 17:40)      SUBJECTIVE & OBJECTIVE: Pt seen and examined at bedside.     ICU Vital Signs Last 24 Hrs  T(C): 35.8 (19 Dec 2019 16:09), Max: 36.8 (18 Dec 2019 23:59)  T(F): 96.5 (19 Dec 2019 16:09), Max: 98.3 (18 Dec 2019 23:59)  HR: 100 (19 Dec 2019 16:09) (82 - 100)  BP: 137/88 (19 Dec 2019 16:09) (122/78 - 137/88)  BP(mean): --  ABP: --  ABP(mean): --  RR: 19 (19 Dec 2019 16:09) (18 - 19)  SpO2: 93% (19 Dec 2019 16:09) (93% - 98%)        MEDICATIONS  (STANDING):  influenza   Vaccine 0.5 milliLiter(s) IntraMuscular once  lactobacillus acidophilus 1 Tablet(s) Oral two times a day  pantoprazole    Tablet 40 milliGRAM(s) Oral before breakfast  piperacillin/tazobactam IVPB.. 3.375 Gram(s) IV Intermittent every 8 hours  simvastatin 20 milliGRAM(s) Oral at bedtime  venlafaxine XR. 150 milliGRAM(s) Oral daily    MEDICATIONS  (PRN):  acetaminophen   Tablet .. 650 milliGRAM(s) Oral every 6 hours PRN Temp greater or equal to 38C (100.4F), Mild Pain (1 - 3), Moderate Pain (4 - 6)  ALPRAZolam 0.25 milliGRAM(s) Oral three times a day PRN anxiety  melatonin 3 milliGRAM(s) Oral at bedtime PRN Insomnia  ondansetron Injectable 4 milliGRAM(s) IV Push every 6 hours PRN Nausea and/or Vomiting  oxyCODONE    IR 5 milliGRAM(s) Oral every 4 hours PRN Severe Pain (7 - 10)      LABS:                        10.1   16.77 )-----------( 517      ( 19 Dec 2019 10:10 )             33.2     -    137  |  100  |  9.0  ----------------------------<  142<H>  4.6   |  23.0  |  0.86    Ca    9.6      19 Dec 2019 10:10    TPro  7.8  /  Alb  3.2<L>  /  TBili  0.4  /  DBili  0.1  /  AST  18  /  ALT  18  /  AlkPhos  127<H>  -      PHYSICAL EXAM:    GENERAL: NAD, well-groomed, well-developed  HEAD:  Atraumatic, Normocephalic  EYES: EOMI, PERRLA, conjunctiva and sclera clear  ENMT: Moist mucous membranes  NECK: Supple, No JVD  NERVOUS SYSTEM:  Alert & Oriented X3, Motor Strength 5/5 B/L upper and lower extremities; DTRs 2+ intact and symmetric  CHEST/LUNG: Clear to auscultation bilaterally; No rales, rhonchi, wheezing, or rubs  HEART: Regular rate and rhythm; No murmurs, rubs, or gallops  ABDOMEN: Soft, Nontender, Nondistended; Bowel sounds present  EXTREMITIES:  2+ Peripheral Pulses, No clubbing, cyanosis, or edema

## 2019-12-19 NOTE — CONSULT NOTE ADULT - SUBJECTIVE AND OBJECTIVE BOX
PULMONARY CONSULT NOTE      [facility]-[id number]    Patient is a 64y old  Male who presents with a chief complaint of     HISTORY OF PRESENT ILLNESS:  64 yr old with PMH of Depression, HLD was complaining of lethargy, wt loss 10 lb, fever and chills for some time. He did not initially pursue any help as his dog was sick. Then he went to his PCP and noted to have abnormal labs. [place] [facility] Was noted to have Leucocytosis, thrombocytosis, Ct imaging showing Rt hepatic lobe massess, pulm nodules concerning for malig/ metastasis, Cholelithiasis without cholecystitis. Hepatic abscess suspected and 2 drains inserted by IR. Fluid c/s from 12/6/19 positive for Gram neg rods. Blood c/s neg to date. Was treated with Vanc/ Zosyn/ Flagyl. 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 Tumor markers normal.     MEDICATIONS  (STANDING):  enoxaparin Injectable 40 milliGRAM(s) SubCutaneous daily  influenza   Vaccine 0.5 milliLiter(s) IntraMuscular once  iron sucrose IVPB 200 milliGRAM(s) IV Intermittent every 24 hours  lactobacillus acidophilus 1 Tablet(s) Oral two times a day  pantoprazole    Tablet 40 milliGRAM(s) Oral before breakfast  piperacillin/tazobactam IVPB.. 3.375 Gram(s) IV Intermittent every 8 hours  simvastatin 20 milliGRAM(s) Oral at bedtime  venlafaxine XR. 150 milliGRAM(s) Oral daily      MEDICATIONS  (PRN):  acetaminophen   Tablet .. 650 milliGRAM(s) Oral every 6 hours PRN Temp greater or equal to 38C (100.4F), Mild Pain (1 - 3), Moderate Pain (4 - 6)  ALPRAZolam 0.25 milliGRAM(s) Oral three times a day PRN anxiety  melatonin 3 milliGRAM(s) Oral at bedtime PRN Insomnia  ondansetron Injectable 4 milliGRAM(s) IV Push every 6 hours PRN Nausea and/or Vomiting  oxyCODONE    IR 5 milliGRAM(s) Oral every 4 hours PRN Severe Pain (7 - 10)      Allergies    No Known Allergies    Intolerances        PAST MEDICAL & SURGICAL HISTORY:  Cholesterol depletion  Depression      FAMILY HISTORY:      SOCIAL HISTORY  Smoking History:     REVIEW OF SYSTEMS:    CONSTITUTIONAL:  No fevers, chills, sweats    HEENT:  Eyes:  No diplopia or blurred vision. ENT:  No earache, sore throat or runny nose. sinus headache or postnasl drip    CARDIOVASCULAR:  No pressure, squeezing, tightness, or heaviness about the chest; no palpitations, leg swelling, orthopnea or PND    RESPIRATORY:  No cough, shortness of breath, PND or orthopnea. Mild SOBOE    GASTROINTESTINAL:  No abdominal pain, nausea, vomiting or diarrhea.    GENITOURINARY:  No dysuria, frequency or urgency.    NEUROLOGIC:  No paresthesias, fasciculations, seizures or weakness.    PSYCHIATRIC:  No disorder of thought or mood.    Vital Signs Last 24 Hrs  T(C): 36.8 (18 Dec 2019 23:59), Max: 36.8 (18 Dec 2019 23:59)  T(F): 98.3 (18 Dec 2019 23:59), Max: 98.3 (18 Dec 2019 23:59)  HR: 82 (18 Dec 2019 23:59) (82 - 98)  BP: 124/81 (18 Dec 2019 23:59) (123/76 - 124/81)  BP(mean): --  RR: 18 (18 Dec 2019 23:59) (18 - 18)  SpO2: 98% (18 Dec 2019 23:59) (97% - 98%)    PHYSICAL EXAMINATION:    GENERAL: The patient is a well-developed, well-nourished _____in no apparent distress.     HEENT: Head is normocephalic and atraumatic. Extraocular muscles are intact. Mucous membranes are moist.     NECK: Supple.     LUNGS: Clear to auscultation without wheezing, rales, or rhonchi. Respirations unlabored    HEART: Regular rate and rhythm without murmur.    ABDOMEN: Soft, nontender, and nondistended.  No hepatosplenomegaly is noted.    EXTREMITIES: Without any cyanosis, clubbing, rash, lesions or edema.    NEUROLOGIC: Grossly intact.      LABS:                        8.2    11.14 )-----------( 340      ( 18 Dec 2019 09:58 )             26.8     12-18    137  |  99  |  8.0  ----------------------------<  179<H>  4.1   |  25.0  |  0.75    Ca    9.2      18 Dec 2019 09:58    TPro  6.5<L>  /  Alb  2.8<L>  /  TBili  0.4  /  DBili  x   /  AST  16  /  ALT  14  /  AlkPhos  115  12-18    PT/INR - ( 17 Dec 2019 16:21 )   PT: 14.4 sec;   INR: 1.24 ratio         PTT - ( 17 Dec 2019 16:21 )  PTT:32.9 sec                    MICROBIOLOGY:    RADIOLOGY & ADDITIONAL STUDIES:  < from: CT Chest w/ IV Cont (12.17.19 @ 20:49) >   EXAM:  CT ABDOMEN AND PELVIS OC IC                         EXAM:  CT CHEST IC                          PROCEDURE DATE:  12/17/2019          INTERPRETATION:  FINAL REPORT:    Addendum created by Lake Bull MD on 12/17/2019 9:34:43 PM EST     Addendum to the chest impression    9.2 mm the spiculated nodule in the medial aspect of the left upper lobe.   Series 4, image 11. For both low risk and high risk patients, consider CT   at 3   months, PET/CT or biopsy. (Lakshmi et al., Fleischner Society, 2017)  Initial report created on 12/17/2019 9:33:41 PM EST     PROCEDURE INFORMATION:   Exam: CT Chest With Contrast   Exam date and time: 12/17/2019 8:34 PM   Age: 64 years old   Clinical indication: Lung lesions.    TECHNIQUE:   Imaging protocol: Computed tomography of the chest with intravenous   contrast.   3D rendering: MIP and/or 3D reconstructed images were created by the   technologist.   Contrast material: OMNI 300; Contrast volume: 94 ml; Contrast route: IV;      COMPARISON:   No relevant prior studies available.     FINDINGS:   Lungs: Compressive atelectasis of the entire right lower lobe and   inferior aspect of the left lower lobe. Compressive atelectasis at the   posterior aspect of the right middle lobe 1.1 x 0.9 cm left upper lobe   pulmonary nodule, previously 1.9 x 1.8 cm on 12/4/2019. Previously seen   right-sided nodules not seen on the current study, however could be   secondary to the atelectasis in the current study.  Pleural space: Moderate partially loculated right pleural effusion and   small left pleural effusion.  Heart: Normal size. No pericardial effusion.  Vessels: Thoracic aorta normal in caliber. Coronary artery calcifications.  Mediastinal/hilar: No enlarged lymph nodes.  Chest wall/lower neck: No enlarged axillary lymph nodes.  Bones: Partially imaged fat density lesion in the right humeral head   measuring 1.5 cm, likely an intraosseous lipoma.    IMPRESSION:     Moderate partially loculated right pleural effusion with complete   atelectasis of the right lower lobe.    Small left pleural effusion with atelectasis at the inferior aspect of   the left lower lobe.    1.1 cm left upper lobe pulmonary nodule, decreased in size since   12/4/2019, previously 1.9 cm    Previously seen right-sided pulmonary nodules not seen on the current   study which may be secondary to compressive atelectasis in the right   lower and middle lobes; the pulmonary nodules were likely infectious in   etiology.      =========================  PROCEDURE INFORMATION:   Exam: CT Abdomen With Contrast   Exam date and time: 12/17/2019 8:34 PM   Age: 64 years old   Clinical indication: Abdominal pain and other: Liver abscess; Acute;   Chest   pain; Other: Lung lesions     TECHNIQUE:   Imaging protocol: Computedtomography images of the abdomen with   intravenous   contrast.   3D rendering: MIP and/or 3D reconstructed images were created by the   technologist.   Contrast material: OMNI 300; Contrast volume: 94 ml; Contrast route: IV;      COMPARISON:   CT abdomen/pelvis 12/4/2019 and MRI abdomen 12/10/2019.    FINDINGS:   Liver: 2 drainage catheters in the liver with the more superior catheter   in a collection in the right hepatic lobe measuring 3.3 x 2.1 cm (series   3 image 1:30) and the more inferiorcatheter in a collection measuring   3.2 x 1.4 cm (series 3 image 146). There are additional linear foci of   low attenuation in the right hepatic lobe (series 3 image 140), either   additional areas of fluid or dilated biliary radicles. There additional.  Gallbladder and bile ducts: Cholelithiasis. Also see above.  Pancreas: Normal. No ductal dilation.   Spleen: Enlarged measuring 16.7 cm anteroposterior.  Adrenals: Left adrenal gland thickening. Unremarkable right adrenal gland.  Kidneys and ureters: No hydronephrosis. Bilateral nonobstructing renal   calculi and bilateral renal cysts.    Stomach and bowel: Moderate amount retained fecal material in the colon.   No bowel obstruction. Colonic diverticulosis without evidence of   diverticulitis. Appendix is normal. There is a duodenal diverticulum. Fat   density focus at the anterior aspect of the proximal sigmoid colon   (series 3 image 248) with a high attenuation rim, likely an epiploic   appendage or fat necrosis.    Intraperitoneal space: No ascites or free air  Lymph nodes: No enlarged lymph nodes.  Vessels: Abdominal aorta normal in caliber with mild calcified plaque. No   definite thrombus seen in the IVC.  Bones/joints: No aggressive osseous lesion.  Abdominal wall: Small fat-containing umbilical hernia. Small droplet of   air in the right anterior abdominal wall which may be related to the   placement of the drainage catheters or a subcutaneous injection.    IMPRESSION:     2 drainage catheters in the liver which terminate and fluid collections   measuring 3.3 x 2.1 cm and 3.2 x 1.4 cm.    Additional linear foci of low attenuation in the right hepatic lobe,   either additional areas of fluid or dilated intrahepatic biliary radicles                DARREL NEVAREZ   This document has been electronically signed. Dec 18 2019 10:05AM    < end of copied text >    All films reviewed on PACS PULMONARY CONSULT NOTE      [id number]    Patient is a 64y old  Male who presents with a chief complaint of     HISTORY OF PRESENT ILLNESS:  64 yr old with PMH of Depression, HLD was complaining of lethargy, wt loss 10 lb, fever and chills for some time. He did not initially pursue any help as his dog was sick. Then he went to his PCP and noted to have abnormal labs. He went to [facility] on [place]4/19. Was noted to have Leucocytosis, thrombocytosis, Ct imaging showing Rt hepatic lobe massess, pulm nodules concerning for malig/ metastasis, Cholelithiasis without cholecystitis. Hepatic abscess suspected and 2 drains inserted by IR. Fluid c/s from 12/6/19 positive for Gram neg rods. Blood c/s neg to date. Was treated with Vanc/ Zosyn/ Flagyl. 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 Tumor markers normal.     Pt denies any chest pain, cough wheeze or SOB. Social smoker with derick hx exposures    MEDICATIONS  (STANDING):  enoxaparin Injectable 40 milliGRAM(s) SubCutaneous daily  influenza   Vaccine 0.5 milliLiter(s) IntraMuscular once  iron sucrose IVPB 200 milliGRAM(s) IV Intermittent every 24 hours  lactobacillus acidophilus 1 Tablet(s) Oral two times a day  pantoprazole    Tablet 40 milliGRAM(s) Oral before breakfast  piperacillin/tazobactam IVPB.. 3.375 Gram(s) IV Intermittent every 8 hours  simvastatin 20 milliGRAM(s) Oral at bedtime  venlafaxine XR. 150 milliGRAM(s) Oral daily      MEDICATIONS  (PRN):  acetaminophen   Tablet .. 650 milliGRAM(s) Oral every 6 hours PRN Temp greater or equal to 38C (100.4F), Mild Pain (1 - 3), Moderate Pain (4 - 6)  ALPRAZolam 0.25 milliGRAM(s) Oral three times a day PRN anxiety  melatonin 3 milliGRAM(s) Oral at bedtime PRN Insomnia  ondansetron Injectable 4 milliGRAM(s) IV Push every 6 hours PRN Nausea and/or Vomiting  oxyCODONE    IR 5 milliGRAM(s) Oral every 4 hours PRN Severe Pain (7 - 10)      Allergies    No Known Allergies    Intolerances        PAST MEDICAL & SURGICAL HISTORY:  Cholesterol depletion  Depression      FAMILY HISTORY:      SOCIAL HISTORY  Smoking History:     REVIEW OF SYSTEMS:    CONSTITUTIONAL:  No fevers, chills, sweats    HEENT:  Eyes:  No diplopia or blurred vision. ENT:  No earache, sore throat or runny nose. sinus headache or postnasl drip    CARDIOVASCULAR:  No pressure, squeezing, tightness, or heaviness about the chest; no palpitations, leg swelling, orthopnea or PND    RESPIRATORY:  above    GASTROINTESTINAL:  No abdominal pain, nausea, vomiting or diarrhea.    GENITOURINARY:  No dysuria, frequency or urgency.    NEUROLOGIC:  No paresthesias, fasciculations, seizures or weakness.    PSYCHIATRIC:  No disorder of thought or mood.    Vital Signs Last 24 Hrs  T(C): 36.8 (18 Dec 2019 23:59), Max: 36.8 (18 Dec 2019 23:59)  T(F): 98.3 (18 Dec 2019 23:59), Max: 98.3 (18 Dec 2019 23:59)  HR: 82 (18 Dec 2019 23:59) (82 - 98)  BP: 124/81 (18 Dec 2019 23:59) (123/76 - 124/81)  BP(mean): --  RR: 18 (18 Dec 2019 23:59) (18 - 18)  SpO2: 98% (18 Dec 2019 23:59) (97% - 98%)    PHYSICAL EXAMINATION:    GENERAL: The patient is a well-developed, well-nourished _____in no apparent distress.     HEENT: Head is normocephalic and atraumatic. Extraocular muscles are intact. Mucous membranes are moist.     NECK: Supple.     LUNGS: dullness at rt lung base otherwise Clear to auscultation without wheezing, rales, or rhonchi. Respirations unlabored    HEART: Regular rate and rhythm without murmur.    ABDOMEN: Soft, nontender, and nondistended.  No hepatosplenomegaly is noted. RUQ drains    EXTREMITIES: Without any cyanosis, clubbing, rash, lesions or edema.    NEUROLOGIC: Grossly intact.      LABS:                        8.2    11.14 )-----------( 340      ( 18 Dec 2019 09:58 )             26.8     12-18    137  |  99  |  8.0  ----------------------------<  179<H>  4.1   |  25.0  |  0.75    Ca    9.2      18 Dec 2019 09:58    TPro  6.5<L>  /  Alb  2.8<L>  /  TBili  0.4  /  DBili  x   /  AST  16  /  ALT  14  /  AlkPhos  115  12-18    PT/INR - ( 17 Dec 2019 16:21 )   PT: 14.4 sec;   INR: 1.24 ratio         PTT - ( 17 Dec 2019 16:21 )  PTT:32.9 sec                    MICROBIOLOGY:    RADIOLOGY & ADDITIONAL STUDIES:  < from: CT Chest w/ IV Cont (12.17.19 @ 20:49) >   EXAM:  CT ABDOMEN AND PELVIS OC IC                         EXAM:  CT CHEST IC                          PROCEDURE DATE:  12/17/2019          INTERPRETATION:  FINAL REPORT:    Addendum created by Lake Bull MD on 12/17/2019 9:34:43 PM EST     Addendum to the chest impression    9.2 mm the spiculated nodule in the medial aspect of the left upper lobe.   Series 4, image 11. For both low risk and high risk patients, consider CT   at 3   months, PET/CT or biopsy. (Lakshmi et al., Fleischner Society, 2017)  Initial report created on 12/17/2019 9:33:41 PM EST     PROCEDURE INFORMATION:   Exam: CT Chest With Contrast   Exam date and time: 12/17/2019 8:34 PM   Age: 64 years old   Clinical indication: Lung lesions.    TECHNIQUE:   Imaging protocol: Computed tomography of the chest with intravenous   contrast.   3D rendering: MIP and/or 3D reconstructed images were created by the   technologist.   Contrast material: OMNI 300; Contrast volume: 94 ml; Contrast route: IV;      COMPARISON:   No relevant prior studies available.     FINDINGS:   Lungs: Compressive atelectasis of the entire right lower lobe and   inferior aspect of the left lower lobe. Compressive atelectasis at the   posterior aspect of the right middle lobe 1.1 x 0.9 cm left upper lobe   pulmonary nodule, previously 1.9 x 1.8 cm on 12/4/2019. Previously seen   right-sided nodules not seen on the current study, however could be   secondary to the atelectasis in the current study.  Pleural space: Moderate partially loculated right pleural effusion and   small left pleural effusion.  Heart: Normal size. No pericardial effusion.  Vessels: Thoracic aorta normal in caliber. Coronary artery calcifications.  Mediastinal/hilar: No enlarged lymph nodes.  Chest wall/lower neck: No enlarged axillary lymph nodes.  Bones: Partially imaged fat density lesion in the right humeral head   measuring 1.5 cm, likely an intraosseous lipoma.    IMPRESSION:     Moderate partially loculated right pleural effusion with complete   atelectasis of the right lower lobe.    Small left pleural effusion with atelectasis at the inferior aspect of   the left lower lobe.    1.1 cm left upper lobe pulmonary nodule, decreased in size since   12/4/2019, previously 1.9 cm    Previously seen right-sided pulmonary nodules not seen on the current   study which may be secondary to compressive atelectasis in the right   lower and middle lobes; the pulmonary nodules were likely infectious in   etiology.      =========================  PROCEDURE INFORMATION:   Exam: CT Abdomen With Contrast   Exam date and time: 12/17/2019 8:34 PM   Age: 64 years old   Clinical indication: Abdominal pain and other: Liver abscess; Acute;   Chest   pain; Other: Lung lesions     TECHNIQUE:   Imaging protocol: Computedtomography images of the abdomen with   intravenous   contrast.   3D rendering: MIP and/or 3D reconstructed images were created by the   technologist.   Contrast material: OMNI 300; Contrast volume: 94 ml; Contrast route: IV;      COMPARISON:   CT abdomen/pelvis 12/4/2019 and MRI abdomen 12/10/2019.    FINDINGS:   Liver: 2 drainage catheters in the liver with the more superior catheter   in a collection in the right hepatic lobe measuring 3.3 x 2.1 cm (series   3 image 1:30) and the more inferiorcatheter in a collection measuring   3.2 x 1.4 cm (series 3 image 146). There are additional linear foci of   low attenuation in the right hepatic lobe (series 3 image 140), either   additional areas of fluid or dilated biliary radicles. There additional.  Gallbladder and bile ducts: Cholelithiasis. Also see above.  Pancreas: Normal. No ductal dilation.   Spleen: Enlarged measuring 16.7 cm anteroposterior.  Adrenals: Left adrenal gland thickening. Unremarkable right adrenal gland.  Kidneys and ureters: No hydronephrosis. Bilateral nonobstructing renal   calculi and bilateral renal cysts.    Stomach and bowel: Moderate amount retained fecal material in the colon.   No bowel obstruction. Colonic diverticulosis without evidence of   diverticulitis. Appendix is normal. There is a duodenal diverticulum. Fat   density focus at the anterior aspect of the proximal sigmoid colon   (series 3 image 248) with a high attenuation rim, likely an epiploic   appendage or fat necrosis.    Intraperitoneal space: No ascites or free air  Lymph nodes: No enlarged lymph nodes.  Vessels: Abdominal aorta normal in caliber with mild calcified plaque. No   definite thrombus seen in the IVC.  Bones/joints: No aggressive osseous lesion.  Abdominal wall: Small fat-containing umbilical hernia. Small droplet of   air in the right anterior abdominal wall which may be related to the   placement of the drainage catheters or a subcutaneous injection.    IMPRESSION:     2 drainage catheters in the liver which terminate and fluid collections   measuring 3.3 x 2.1 cm and 3.2 x 1.4 cm.    Additional linear foci of low attenuation in the right hepatic lobe,   either additional areas of fluid or dilated intrahepatic biliary radicles                DARREL NEVAREZ   This document has been electronically signed. Dec 18 2019 10:05AM    < end of copied text >    All films reviewed on PACS

## 2019-12-19 NOTE — CONSULT NOTE ADULT - PROBLEM SELECTOR RECOMMENDATION 9
Right. Possibly reactive to liver disease, but loculations increase suspicion of infection.   FU pleural fluid labs and cultures. Ensure cytology received in AM.  Maintain CT to water seal  Daily CXR  IS/OOB  C/W IV ABX as per ID  D/W Dr. Marsh

## 2019-12-19 NOTE — CONSULT NOTE ADULT - PROBLEM SELECTOR RECOMMENDATION 2
MATTHEW nodule decreasing in size on CT since 12/4/19. Likely reactive.   Will need to follow as outpatient with repeat imaging in the future.

## 2019-12-19 NOTE — CONSULT NOTE ADULT - SUBJECTIVE AND OBJECTIVE BOX
HPI:  64 yr old with PMH of Depression, HLD was complaining of lethargy, wt loss 10 lb, fever and chills for some time. He did not initially pursue any help as his dog was sick. Then he went to his PCP and noted to have abnormal labs. He went to [facility] on 19. Was noted to have Leucocytosis, thrombocytosis, Ct imaging showing Rt hepatic lobe massess, pulm nodules concerning for malig/ metastasis, Cholelithiasis without cholecystitis. Hepatic abscess suspected and 2 drains inserted by IR. Fluid c/s from 19 positive for Gram neg rods. Blood c/s neg to date. Was treated with Vanc/ Zosyn/ Flagyl. 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 Tumor markers normal.   Denies fever/ pain/ n/v.     SH- former smoker, current alcohol use, current marijuana use  FH- M-  - leukemia; F- alive (17 Dec 2019 17:40)      Thoracic consulted for pleural effusion. IR placed R PTC today. Approx 130cc of dark serous fluid in pleurevac. Fluid sent for chemistry, culture, and cytology (awaiting to be received). Patient states his breathing feels slightly better. Saturating  Denies any fever, chills on this admission. No hemoptysis, weight loss, HA, back pain.       PAST MEDICAL & SURGICAL HISTORY:  Cholesterol depletion  Depression      REVIEW OF SYSTEMS      General:No Weight change/ Fatigue/ HA/Dizzy	    Skin/Breast: No Rashes/ Lesions/ Masses  	  Ophthalmologic: No Blurry vision/ Glaucoma/ Blindness  	  ENMT: No Hearing loss/ Drainage/ Lesions	    Respiratory and Thorax: No Cough/ Wheezing/ SOB/ Hemoptysis/ Sputum production  	  Cardiovascular: No Chest pain/ Palpitations/ Diaphoresis	    Gastrointestinal: No Nausea/ Vomiting/ Constipation/ Appetite Change	    Genitourinary: No Heamturia/ Dysuria/ Frequency change/ Impotence	    Musculoskeletal: No Pain/ Weakness/ Claudication	    Neurological: No Seizures/ TIA/CVA/ Parastesias	    Psychiatric: No Dementia/ Depression/ SI/HI	    Hematology/Lymphatics: No hx of bleeding/ Edema	    Endocrine:	No Hyperglycemia/ Hypoglycemia    Allergic/Immunologic:	 No Anaphylaxis/ Intolerance/ Recent illnesses    MEDICATIONS  (STANDING):  influenza   Vaccine 0.5 milliLiter(s) IntraMuscular once  lactobacillus acidophilus 1 Tablet(s) Oral two times a day  pantoprazole    Tablet 40 milliGRAM(s) Oral before breakfast  piperacillin/tazobactam IVPB.. 3.375 Gram(s) IV Intermittent every 8 hours  simvastatin 20 milliGRAM(s) Oral at bedtime  venlafaxine XR. 150 milliGRAM(s) Oral daily    MEDICATIONS  (PRN):  acetaminophen   Tablet .. 650 milliGRAM(s) Oral every 6 hours PRN Temp greater or equal to 38C (100.4F), Mild Pain (1 - 3), Moderate Pain (4 - 6)  ALPRAZolam 0.25 milliGRAM(s) Oral three times a day PRN anxiety  melatonin 3 milliGRAM(s) Oral at bedtime PRN Insomnia  ondansetron Injectable 4 milliGRAM(s) IV Push every 6 hours PRN Nausea and/or Vomiting  oxyCODONE    IR 5 milliGRAM(s) Oral every 4 hours PRN Severe Pain (7 - 10)      Allergies    No Known Allergies    Intolerances        SOCIAL HISTORY:  Occupation:  Smoking Hx:   Etoh Hx:   IVDA Hx:     FAMILY HISTORY:      Vital Signs Last 24 Hrs  T(C): 35.8 (19 Dec 2019 16:09), Max: 36.8 (18 Dec 2019 23:59)  T(F): 96.5 (19 Dec 2019 16:09), Max: 98.3 (18 Dec 2019 23:59)  HR: 100 (19 Dec 2019 16:09) (82 - 100)  BP: 137/88 (19 Dec 2019 16:09) (122/78 - 137/88)  BP(mean): --  RR: 19 (19 Dec 2019 16:09) (18 - 19)  SpO2: 93% (19 Dec 2019 16:09) (93% - 98%)    General: WN/WD NAD  Neurology: Awake, nonfocal, VELASQUEZ x 4  Eyes: Scleras clear, PERRLA/ EOMI, Gross vision intact  ENT:Gross hearing intact, grossly patent pharynx, no stridor  Neck: Neck supple, trachea midline, No JVD,   Respiratory: CTA B/L, No wheezing, rales, rhonchi  CV: RRR, S1S2, no murmurs, rubs or gallops  Abdominal: Soft, NT, ND +BS,   Extremities: No edema, + peripheral pulses  Skin: No Rashes, Hematoma, Ecchymosis  Lymphatic: No Neck, axilla, groin LAD  Psych: Oriented x 3, normal affect  Incisions:   Tubes:    LABS:                        10.1   16.77 )-----------( 517      ( 19 Dec 2019 10:10 )             33.2     -    137  |  100  |  9.0  ----------------------------<  142<H>  4.6   |  23.0  |  0.86    Ca    9.6      19 Dec 2019 10:10    TPro  7.8  /  Alb  3.2<L>  /  TBili  0.4  /  DBili  0.1  /  AST  18  /  ALT  18  /  AlkPhos  127<H>            RADIOLOGY & ADDITIONAL STUDIES:    ASSESSMENT:   64yMalePAST MEDICAL & SURGICAL HISTORY:  Cholesterol depletion  Depression  HEALTH ISSUES - PROBLEM Dx:  Liver abscess: Liver abscess      HEALTH ISSUES - R/O PROBLEM Dx:      PLAN:    Shannan LORD  Thoracic Surgery  #[phone] HPI:  64 yr old with PMH of ROSELINE on CPAP at home, Depression, anxiety, HLD; was complaining of lethargy, wt loss 10 lb, fever and chills since October where he was prescribed an ABX for URI by PMD and developed then developed fever and chills that he attributed to a panic attack due to putting down his sick dog. He followed up with PMD where he was further worked up and noted to have abnormal labs and CXR. He went to [facility] [place] [facility] on 19. Was noted to have Leucocytosis, thrombocytosis, and CT imaging showing Rt hepatic lobe masses, pulm nodules concerning for malig/ metastasis, and Cholelithiasis without cholecystitis. Hepatic abscess suspected and 2 drains inserted by IR. Fluid c/s from 19 positive for Gram neg rods. Blood c/s neg to date. Was treated with Vanc/ Zosyn/ Flagyl. 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 Tumor markers normal.     Thoracic consulted for right pleural effusion. IR placed R PTC today. Approx 130cc of dark serous fluid in pleurevac. Fluid sent for chemistry, culture, and cytology (awaiting to be received). Patient states his breathing feels slightly better. Saturating well on 1 L NC. Denies any fever, chills on this admission. No hemoptysis, HA, back pain, ABD pain, or diarrhea. No recent sick contacts or travel. Lives in [place] as retired . Patient former social smoker but continues to smoke marijuana. No IV drug or vaping history. Social ETOH use.       PAST MEDICAL & SURGICAL HISTORY:  Cholesterol depletion  Depression      REVIEW OF SYSTEMS  General: Weight loss, fatigue  Skin/Breast: No Rashes/ Lesions/ Masses  Ophthalmologic: No Blurry vision/ Glaucoma/ Blindness  ENMT: No Hearing loss/ Drainage/ Lesions	  Respiratory and Thorax: No Cough/ Wheezing/ SOB/ Hemoptysis/ Sputum production  Cardiovascular: No Chest pain/ Palpitations/ Diaphoresis	  Gastrointestinal: No Nausea/ Vomiting/ Constipation/ Appetite Change	  Genitourinary: No Hematuria/ Dysuria/ Frequency change	  Musculoskeletal: No Pain/ Weakness/ Claudication	  Neurological: No Seizures/ TIA/CVA/ Paresthesia  Psychiatric: Depression and anxiety	  Hematology/Lymphatics: No hx of bleeding/ Edema	  Endocrine: No Hyperglycemia/ Hypoglycemia  Allergic/Immunologic: No Anaphylaxis/ Intolerance    MEDICATIONS  (STANDING):  influenza   Vaccine 0.5 milliLiter(s) IntraMuscular once  lactobacillus acidophilus 1 Tablet(s) Oral two times a day  pantoprazole    Tablet 40 milliGRAM(s) Oral before breakfast  piperacillin/tazobactam IVPB.. 3.375 Gram(s) IV Intermittent every 8 hours  simvastatin 20 milliGRAM(s) Oral at bedtime  venlafaxine XR. 150 milliGRAM(s) Oral daily    MEDICATIONS  (PRN):  acetaminophen   Tablet .. 650 milliGRAM(s) Oral every 6 hours PRN Temp greater or equal to 38C (100.4F), Mild Pain (1 - 3), Moderate Pain (4 - 6)  ALPRAZolam 0.25 milliGRAM(s) Oral three times a day PRN anxiety  melatonin 3 milliGRAM(s) Oral at bedtime PRN Insomnia  ondansetron Injectable 4 milliGRAM(s) IV Push every 6 hours PRN Nausea and/or Vomiting  oxyCODONE    IR 5 milliGRAM(s) Oral every 4 hours PRN Severe Pain (7 - 10)      Allergies    No Known Allergies        SOCIAL HISTORY:  Occupation: Retired  Smoking Hx: smokes marijuana, former smoker cigarette smoker  Etoh Hx: Social   IVDA Hx: Denies    FAMILY HISTORY:  M-  - leukemia; F- alive    Vital Signs Last 24 Hrs  T(C): 35.8 (19 Dec 2019 16:09), Max: 36.8 (18 Dec 2019 23:59)  T(F): 96.5 (19 Dec 2019 16:09), Max: 98.3 (18 Dec 2019 23:59)  HR: 100 (19 Dec 2019 16:09) (82 - 100)  BP: 137/88 (19 Dec 2019 16:09) (122/78 - 137/88)  BP(mean): --  RR: 19 (19 Dec 2019 16:09) (18 - 19)  SpO2: 93% (19 Dec 2019 16:09) (93% - 98%)  I&O's Detail    18 Dec 2019 07:01  -  19 Dec 2019 07:00  --------------------------------------------------------  IN:  Total IN: 0 mL    OUT:    Drain: 20 mL    Voided: 800 mL  Total OUT: 820 mL    Total NET: -820 mL          General: NAD  Neurology: Awake, nonfocal, VELASQUEZ x 4  Eyes: Scleras clear, PERRLA/ EOMI, Gross vision intact  ENT: Gross hearing intact, grossly patent pharynx, no stridor  Neck: Neck supple, trachea midline, No JVD  Respiratory: CTA B/L, diminished BS at B/L bases  CV: RRR, S1S2, no murmurs, rubs or gallops  Abdominal: Soft, NT, ND, Hepatic drains x 2 in place  Extremities: No edema  Skin: No Rashes, Hematoma, Ecchymosis  Psych: Oriented x 3, normal affect  Tubes: R PTC, to WS, approx 130cc dark serous output, no air leak    LABS:                        10.1   16.77 )-----------( 517      ( 19 Dec 2019 10:10 )             33.2         137  |  100  |  9.0  ----------------------------<  142<H>  4.6   |  23.0  |  0.86    Ca    9.6      19 Dec 2019 10:10    TPro  7.8  /  Alb  3.2<L>  /  TBili  0.4  /  DBili  0.1  /  AST  18  /  ALT  18  /  AlkPhos  127<H>            RADIOLOGY & ADDITIONAL STUDIES:  < from: Xray Chest 1 View- PORTABLE-Routine (19 @ 16:01) >  Pigtail thoracotomy tube with small loculated right pleural effusion    < end of copied text >    < from: CT Chest w/ IV Cont (19 @ 20:49) >  Moderate partially loculated right pleural effusion with complete   atelectasis of the right lower lobe.    Small left pleural effusion with atelectasis at the inferior aspect of   the left lower lobe.    1.1 cm left upper lobe pulmonary nodule, decreased in size since   2019, previously 1.9 cm    Previously seen right-sided pulmonary nodules not seen on the current   study which may be secondary to compressive atelectasis in the right   lower and middle lobes; the pulmonary nodules were likely infectious in   etiology.    < end of copied text >    ASSESSMENT:   64yMalePAST MEDICAL & SURGICAL HISTORY:  Cholesterol depletion  Depression  HEALTH ISSUES - PROBLEM Dx:  Liver abscess: Liver abscess

## 2019-12-20 LAB
ALBUMIN FLD-MCNC: 2.4 G/DL — SIGNIFICANT CHANGE UP
ANION GAP SERPL CALC-SCNC: 14 MMOL/L — SIGNIFICANT CHANGE UP (ref 5–17)
BASOPHILS # BLD AUTO: 0.05 K/UL — SIGNIFICANT CHANGE UP (ref 0–0.2)
BASOPHILS NFR BLD AUTO: 0.4 % — SIGNIFICANT CHANGE UP (ref 0–2)
BUN SERPL-MCNC: 12 MG/DL — SIGNIFICANT CHANGE UP (ref 8–20)
CALCIUM SERPL-MCNC: 9.8 MG/DL — SIGNIFICANT CHANGE UP (ref 8.6–10.2)
CHLORIDE SERPL-SCNC: 96 MMOL/L — LOW (ref 98–107)
CO2 SERPL-SCNC: 24 MMOL/L — SIGNIFICANT CHANGE UP (ref 22–29)
CREAT SERPL-MCNC: 0.81 MG/DL — SIGNIFICANT CHANGE UP (ref 0.5–1.3)
EOSINOPHIL # BLD AUTO: 0.57 K/UL — HIGH (ref 0–0.5)
EOSINOPHIL NFR BLD AUTO: 4.3 % — SIGNIFICANT CHANGE UP (ref 0–6)
FERRITIN SERPL-MCNC: 2202 NG/ML — HIGH (ref 30–400)
GLUCOSE FLD-MCNC: 38 MG/DL — SIGNIFICANT CHANGE UP
GLUCOSE SERPL-MCNC: 92 MG/DL — SIGNIFICANT CHANGE UP (ref 70–115)
HCT VFR BLD CALC: 31.7 % — LOW (ref 39–50)
HGB BLD-MCNC: 9.6 G/DL — LOW (ref 13–17)
IMM GRANULOCYTES NFR BLD AUTO: 0.9 % — SIGNIFICANT CHANGE UP (ref 0–1.5)
IRON SATN MFR SERPL: 65 UG/DL — SIGNIFICANT CHANGE UP (ref 59–158)
LDH SERPL L TO P-CCNC: 172 U/L — SIGNIFICANT CHANGE UP (ref 98–192)
LDH SERPL L TO P-CCNC: 771 U/L — SIGNIFICANT CHANGE UP
LYMPHOCYTES # BLD AUTO: 29.2 % — SIGNIFICANT CHANGE UP (ref 13–44)
LYMPHOCYTES # BLD AUTO: 3.83 K/UL — HIGH (ref 1–3.3)
MAGNESIUM SERPL-MCNC: 2.1 MG/DL — SIGNIFICANT CHANGE UP (ref 1.6–2.6)
MCHC RBC-ENTMCNC: 26.5 PG — LOW (ref 27–34)
MCHC RBC-ENTMCNC: 30.3 GM/DL — LOW (ref 32–36)
MCV RBC AUTO: 87.6 FL — SIGNIFICANT CHANGE UP (ref 80–100)
MONOCYTES # BLD AUTO: 1.16 K/UL — HIGH (ref 0–0.9)
MONOCYTES NFR BLD AUTO: 8.8 % — SIGNIFICANT CHANGE UP (ref 2–14)
NEUTROPHILS # BLD AUTO: 7.4 K/UL — SIGNIFICANT CHANGE UP (ref 1.8–7.4)
NEUTROPHILS NFR BLD AUTO: 56.4 % — SIGNIFICANT CHANGE UP (ref 43–77)
NIGHT BLUE STAIN TISS: SIGNIFICANT CHANGE UP
PHOSPHATE SERPL-MCNC: 4.1 MG/DL — SIGNIFICANT CHANGE UP (ref 2.4–4.7)
PLATELET # BLD AUTO: 444 K/UL — HIGH (ref 150–400)
POTASSIUM SERPL-MCNC: 4.4 MMOL/L — SIGNIFICANT CHANGE UP (ref 3.5–5.3)
POTASSIUM SERPL-SCNC: 4.4 MMOL/L — SIGNIFICANT CHANGE UP (ref 3.5–5.3)
PROT FLD-MCNC: 4.9 G/DL — SIGNIFICANT CHANGE UP
PROT SERPL-MCNC: 7.7 G/DL — SIGNIFICANT CHANGE UP (ref 6.6–8.7)
RBC # BLD: 3.62 M/UL — LOW (ref 4.2–5.8)
RBC # FLD: 17 % — HIGH (ref 10.3–14.5)
SODIUM SERPL-SCNC: 134 MMOL/L — LOW (ref 135–145)
SPECIMEN SOURCE: SIGNIFICANT CHANGE UP
WBC # BLD: 13.13 K/UL — HIGH (ref 3.8–10.5)
WBC # FLD AUTO: 13.13 K/UL — HIGH (ref 3.8–10.5)

## 2019-12-20 PROCEDURE — 99233 SBSQ HOSP IP/OBS HIGH 50: CPT

## 2019-12-20 PROCEDURE — 99232 SBSQ HOSP IP/OBS MODERATE 35: CPT

## 2019-12-20 PROCEDURE — 74182 MRI ABDOMEN W/CONTRAST: CPT | Mod: 26

## 2019-12-20 RX ADMIN — OXYCODONE HYDROCHLORIDE 5 MILLIGRAM(S): 5 TABLET ORAL at 00:47

## 2019-12-20 RX ADMIN — Medication 1 TABLET(S): at 05:15

## 2019-12-20 RX ADMIN — SIMVASTATIN 20 MILLIGRAM(S): 20 TABLET, FILM COATED ORAL at 22:30

## 2019-12-20 RX ADMIN — Medication 3 MILLIGRAM(S): at 22:30

## 2019-12-20 RX ADMIN — Medication 0.25 MILLIGRAM(S): at 14:34

## 2019-12-20 RX ADMIN — OXYCODONE HYDROCHLORIDE 5 MILLIGRAM(S): 5 TABLET ORAL at 10:45

## 2019-12-20 RX ADMIN — PIPERACILLIN AND TAZOBACTAM 25 GRAM(S): 4; .5 INJECTION, POWDER, LYOPHILIZED, FOR SOLUTION INTRAVENOUS at 16:03

## 2019-12-20 RX ADMIN — PIPERACILLIN AND TAZOBACTAM 25 GRAM(S): 4; .5 INJECTION, POWDER, LYOPHILIZED, FOR SOLUTION INTRAVENOUS at 12:17

## 2019-12-20 RX ADMIN — Medication 1 TABLET(S): at 16:03

## 2019-12-20 RX ADMIN — OXYCODONE HYDROCHLORIDE 5 MILLIGRAM(S): 5 TABLET ORAL at 05:45

## 2019-12-20 RX ADMIN — Medication 0.25 MILLIGRAM(S): at 22:30

## 2019-12-20 RX ADMIN — PANTOPRAZOLE SODIUM 40 MILLIGRAM(S): 20 TABLET, DELAYED RELEASE ORAL at 05:15

## 2019-12-20 RX ADMIN — OXYCODONE HYDROCHLORIDE 5 MILLIGRAM(S): 5 TABLET ORAL at 19:30

## 2019-12-20 RX ADMIN — Medication 150 MILLIGRAM(S): at 12:18

## 2019-12-20 RX ADMIN — PIPERACILLIN AND TAZOBACTAM 25 GRAM(S): 4; .5 INJECTION, POWDER, LYOPHILIZED, FOR SOLUTION INTRAVENOUS at 01:48

## 2019-12-20 RX ADMIN — OXYCODONE HYDROCHLORIDE 5 MILLIGRAM(S): 5 TABLET ORAL at 01:10

## 2019-12-20 RX ADMIN — OXYCODONE HYDROCHLORIDE 5 MILLIGRAM(S): 5 TABLET ORAL at 20:00

## 2019-12-20 RX ADMIN — OXYCODONE HYDROCHLORIDE 5 MILLIGRAM(S): 5 TABLET ORAL at 05:15

## 2019-12-20 RX ADMIN — OXYCODONE HYDROCHLORIDE 5 MILLIGRAM(S): 5 TABLET ORAL at 09:34

## 2019-12-20 RX ADMIN — PIPERACILLIN AND TAZOBACTAM 25 GRAM(S): 4; .5 INJECTION, POWDER, LYOPHILIZED, FOR SOLUTION INTRAVENOUS at 22:31

## 2019-12-20 NOTE — PROGRESS NOTE ADULT - SUBJECTIVE AND OBJECTIVE BOX
[facility]  INFECTIOUS DISEASES AND INTERNAL MEDICINE at [place]  =======================================================  David Dumont MD  Diplomates American Board of Internal Medicine and Infectious Diseases  Tel: [phone]      Fax: [phone]  =======================================================    SHIN DEGROOT [id number]    Follow up: Hepatic abscess    No fever or chills  no complaints      Allergies:  No Known Allergies      Antibiotics:  piperacillin/tazobactam IVPB.. 3.375 Gram(s) IV Intermittent every 8 hours      REVIEW OF SYSTEMS:  CONSTITUTIONAL:  No Fever or chills  HEENT:  No diplopia or blurred vision.  No earache, sore throat or runny nose.  CARDIOVASCULAR:  No pressure, squeezing, strangling, tightness, heaviness or aching about the chest, neck, axilla or epigastrium.  RESPIRATORY:  No cough, shortness of breath  GASTROINTESTINAL:  No nausea, vomiting or diarrhea.  GENITOURINARY:  No dysuria, frequency or urgency.   MUSCULOSKELETAL:  no joint aches, no muscle pain  SKIN:  No change in skin, hair or nails.  NEUROLOGIC:  No Headaches, seizures or weakness.  PSYCHIATRIC:  No disorder of thought or mood.  ENDOCRINE:  No heat or cold intolerance  HEMATOLOGICAL:  No easy bruising or bleeding.       Physical Exam:  Vital Signs Last 24 Hrs  T(C): 36.8 (20 Dec 2019 07:46), Max: 36.8 (20 Dec 2019 07:46)  T(F): 98.2 (20 Dec 2019 07:46), Max: 98.2 (20 Dec 2019 07:46)  HR: 75 (20 Dec 2019 07:46) (75 - 100)  BP: 109/55 (20 Dec 2019 07:46) (109/55 - 137/88)  RR: 20 (20 Dec 2019 07:46) (18 - 20)  SpO2: 91% (20 Dec 2019 07:46) (91% - 98%)      GEN: NAD, pleasant  HEENT: normocephalic and atraumatic. EOMI. PERRL.  Anicteric  NECK: Supple.   LUNGS: Clear to auscultation.  HEART: Regular rate and rhythm .  ABDOMEN: Soft, nontender, and nondistended.  Positive bowel sounds.  + Drain   : No CVA tenderness  EXTREMITIES: Without any edema.  MSK: No joint swelling  NEUROLOGIC: No Focal Deficits  PSYCHIATRIC: Appropriate affect .  SKIN: No Rash      Labs:  12-20    134<L>  |  96<L>  |  12.0  ----------------------------<  92  4.4   |  24.0  |  0.81    Ca    9.8      20 Dec 2019 08:57  Phos  4.1     12-20  Mg     2.1     12-20    TPro  7.7  /  Alb  x   /  TBili  x   /  DBili  x   /  AST  x   /  ALT  x   /  AlkPhos  x   12-20                          9.6    13.13 )-----------( 444      ( 20 Dec 2019 08:57 )             31.7       LIVER FUNCTIONS - ( 20 Dec 2019 08:57 )  Alb: x     / Pro: 7.7 g/dL / ALK PHOS: x     / ALT: x     / AST: x     / GGT: x             RECENT CULTURES:  12-19 @ 16:01 .Body Fluid     Few White blood cells  No organisms seen      OUTSIDE CULTURES:  12/6/19 Body fluid culture from [facility]  Gram negative rods (Anaerobic) - Limited growth and not able to be identify     Blood cultures at [facility] negative        < from: CT Abdomen and Pelvis w/ Oral Cont and w/ IV Cont (12.17.19 @ 20:52) >  EXAM:  CT ABDOMEN AND PELVIS OC IC                         EXAM:  CT CHEST IC                          PROCEDURE DATE:  12/17/2019      INTERPRETATION:  FINAL REPORT:    Addendum created by Lake Bull MD on 12/17/2019 9:34:43 PM EST     Addendum to the chest impression    9.2 mm the spiculated nodule in the medial aspect of the left upper lobe.   Series 4, image 11. For both low risk and high risk patients, consider CT   at 3   months, PET/CT or biopsy. (MacMahon, et al., Fleischner Society, 2017)  Initial report created on 12/17/2019 9:33:41 PM EST     PROCEDURE INFORMATION:   Exam: CT Chest With Contrast   Exam date and time: 12/17/2019 8:34 PM   Age: 64 years old   Clinical indication: Lung lesions.    TECHNIQUE:   Imaging protocol: Computed tomography of the chest with intravenous   contrast.   3D rendering: MIP and/or 3D reconstructed images were created by the   technologist.   Contrast material: OMNI 300; Contrast volume: 94 ml; Contrast route: IV;      COMPARISON:   No relevant prior studies available.     FINDINGS:   Lungs: Compressive atelectasis of the entire right lower lobe and   inferior aspect of the left lower lobe. Compressive atelectasis at the   posterior aspect of the right middle lobe 1.1 x 0.9 cm left upper lobe   pulmonary nodule, previously 1.9 x 1.8 cm on 12/4/2019. Previously seen   right-sided nodules not seen on the current study, however could be   secondary to the atelectasis in the current study.  Pleural space: Moderate partially loculated right pleural effusion and   small left pleural effusion.  Heart: Normal size. No pericardial effusion.  Vessels: Thoracic aorta normal in caliber. Coronary artery calcifications.  Mediastinal/hilar: No enlarged lymph nodes.  Chest wall/lower neck: No enlarged axillary lymph nodes.  Bones: Partially imaged fat density lesion in the right humeral head   measuring 1.5 cm, likely an intraosseous lipoma.    IMPRESSION:     Moderate partially loculated right pleural effusion with complete   atelectasis of the right lower lobe.    Small left pleural effusion with atelectasis at the inferior aspect of   the left lower lobe.    1.1 cm left upper lobe pulmonary nodule, decreased in size since   12/4/2019, previously 1.9 cm    Previously seen right-sided pulmonary nodules not seen on the current   study which may be secondary to compressive atelectasis in the right   lower and middle lobes; the pulmonary nodules were likely infectious in   etiology.      =========================  PROCEDURE INFORMATION:   Exam: CT Abdomen With Contrast   Exam date and time: 12/17/2019 8:34 PM   Age: 64 years old   Clinical indication: Abdominal pain and other: Liver abscess; Acute;   Chest   pain; Other: Lung lesions     TECHNIQUE:   Imaging protocol: Computedtomography images of the abdomen with   intravenous   contrast.   3D rendering: MIP and/or 3D reconstructed images were created by the   technologist.   Contrast material: OMNI 300; Contrast volume: 94 ml; Contrast route: IV;      COMPARISON:   CT abdomen/pelvis 12/4/2019 and MRI abdomen 12/10/2019.    FINDINGS:   Liver: 2 drainage catheters in the liver with the more superior catheter   in a collection in the right hepatic lobe measuring 3.3 x 2.1 cm (series   3 image 1:30) and the more inferiorcatheter in a collection measuring   3.2 x 1.4 cm (series 3 image 146). There are additional linear foci of   low attenuation in the right hepatic lobe (series 3 image 140), either   additional areas of fluid or dilated biliary radicles. There additional.  Gallbladder and bile ducts: Cholelithiasis. Also see above.  Pancreas: Normal. No ductal dilation.   Spleen: Enlarged measuring 16.7 cm anteroposterior.  Adrenals: Left adrenal gland thickening. Unremarkable right adrenal gland.  Kidneys and ureters: No hydronephrosis. Bilateral nonobstructing renal   calculi and bilateral renal cysts.    Stomach and bowel: Moderate amount retained fecal material in the colon.   No bowel obstruction. Colonic diverticulosis without evidence of   diverticulitis. Appendix is normal. There is a duodenal diverticulum. Fat   density focus at the anterior aspect of the proximal sigmoid colon   (series 3 image 248) with a high attenuation rim, likely an epiploic   appendage or fat necrosis.    Intraperitoneal space: No ascites or free air  Lymph nodes: No enlarged lymph nodes.  Vessels: Abdominal aorta normal in caliber with mild calcified plaque. No   definite thrombus seen in the IVC.  Bones/joints: No aggressive osseous lesion.  Abdominal wall: Small fat-containing umbilical hernia. Small droplet of   air in the right anterior abdominal wall which may be related to the   placement of the drainage catheters or a subcutaneous injection.    IMPRESSION:     2 drainage catheters in the liver which terminate and fluid collections   measuring 3.3 x 2.1 cm and 3.2 x 1.4 cm.    Additional linear foci of low attenuation in the right hepatic lobe,   either additional areas of fluid or dilated intrahepatic biliary radicles    < end of copied text >

## 2019-12-20 NOTE — PROGRESS NOTE ADULT - SUBJECTIVE AND OBJECTIVE BOX
PULMONARY PROGRESS NOTE      [facility]-[id number]    Patient is a 64y old  Male who presents with a chief complaint of fever (20 Dec 2019 08:18)      INTERVAL HPI/OVERNIGHT EVENTS: S/P MRCP. Chest tube draining on R. Less sob. Still difficulty taking deep breath though.     MEDICATIONS  (STANDING):  influenza   Vaccine 0.5 milliLiter(s) IntraMuscular once  lactobacillus acidophilus 1 Tablet(s) Oral two times a day  pantoprazole    Tablet 40 milliGRAM(s) Oral before breakfast  piperacillin/tazobactam IVPB.. 3.375 Gram(s) IV Intermittent every 8 hours  simvastatin 20 milliGRAM(s) Oral at bedtime  venlafaxine XR. 150 milliGRAM(s) Oral daily      MEDICATIONS  (PRN):  acetaminophen   Tablet .. 650 milliGRAM(s) Oral every 6 hours PRN Temp greater or equal to 38C (100.4F), Mild Pain (1 - 3), Moderate Pain (4 - 6)  ALPRAZolam 0.25 milliGRAM(s) Oral three times a day PRN anxiety  melatonin 3 milliGRAM(s) Oral at bedtime PRN Insomnia  ondansetron Injectable 4 milliGRAM(s) IV Push every 6 hours PRN Nausea and/or Vomiting  oxyCODONE    IR 5 milliGRAM(s) Oral every 4 hours PRN Severe Pain (7 - 10)      Allergies    No Known Allergies    Intolerances        PAST MEDICAL & SURGICAL HISTORY:  Cholesterol depletion  Depression             REVIEW OF SYSTEMS:    CONSTITUTIONAL:  No distress    HEENT:  Eyes:  No diplopia or blurred vision. ENT:  No earache, sore throat or runny nose.    CARDIOVASCULAR:  No pressure, squeezing, tightness, heaviness or aching about the chest; no palpitations.    RESPIRATORY:  per HPI     GASTROINTESTINAL:  per HPI    GENITOURINARY:  No dysuria, frequency or urgency.    NEUROLOGIC:  No paresthesias, fasciculations, seizures or weakness.    PSYCHIATRIC:  No disorder of thought or mood.    Vital Signs Last 24 Hrs  T(C): 36.8 (20 Dec 2019 07:46), Max: 36.8 (20 Dec 2019 07:46)  T(F): 98.2 (20 Dec 2019 07:46), Max: 98.2 (20 Dec 2019 07:46)  HR: 75 (20 Dec 2019 07:46) (75 - 100)  BP: 109/55 (20 Dec 2019 07:46) (109/55 - 137/88)  BP(mean): --  RR: 20 (20 Dec 2019 07:46) (18 - 20)  SpO2: 91% (20 Dec 2019 07:46) (91% - 98%)    PHYSICAL EXAMINATION:    GENERAL: The patient is awake and alert in no apparent distress.     HEENT: Head is normocephalic and atraumatic. Mucous membranes are moist.    NECK: Supple.    LUNGS: decreased BS R, respirations unlabored    HEART: Regular rate and rhythm      ABDOMEN: Soft, nontender, and nondistended.      EXTREMITIES: Without any cyanosis, clubbing, rash, lesions or edema.    NEUROLOGIC: Grossly intact.    LABS:                        9.6    13.13 )-----------( 444      ( 20 Dec 2019 08:57 )             31.7     12-20    134<L>  |  96<L>  |  12.0  ----------------------------<  92  4.4   |  24.0  |  0.81    Ca    9.8      20 Dec 2019 08:57  Phos  4.1     12-20  Mg     2.1     12-20    TPro  7.7  /  Alb  x   /  TBili  x   /  DBili  x   /  AST  x   /  ALT  x   /  AlkPhos  x   12-20      Culture - Body Fluid with Gram Stain (12.19.19 @ 16:01)    Gram Stain:   Few White blood cells  No organisms seen    Specimen Source: .Body Fluid    Culture Results:   No growth at 1 day.  Culture in progress    Cell Count, Body Fluid (12.19.19 @ 16:01)    Fluid Segmented Granulocytes: N/A %    Fluid Type: Pleural    Comment - Fluids: AB    Body Fluid Appearance: Cloudy    Tube Type: Tube 2    Color - Body Fluid: Orange    Total Nucleated Cell Count, Body Fluid: 43: Peritoneal/Pleural/ Pericardial  Body Fluid Types            Total Nucleated cells: <500/uL            Neutrophils: <25%          Synovial Body Fluid Type           Total Nucleated cells: <150/uL           Neutrophils: <25%           Lymphocytes: <75%           Moncytes/Macrophages: </=70%  Please note reference range updated effective Nov 26, 2019 /uL    Total RBC Count: 460 /uL           Protein Total, Fluid (12.19.19 @ 21:37)    Protein Total, Fluid: 4.9: Reference Ranges have NOT been established for analytes in body fluids  because of variability in body fluid composition.    Lactate Dehydrogenase, Fluid (12.19.19 @ 21:37)    Lactate Dehydrogenase, Fluid: 771: Reference Ranges have NOT been established for analytes in body fluids  because of variability in body fluid composition.

## 2019-12-20 NOTE — PROGRESS NOTE ADULT - ASSESSMENT
-Pleural effusion; s/p drainage; cultures negative; exudative with high LDH; pigtail in place; cytology pending  -Liver abscess  -Lung nodule; decreasing in size  -SOB    RECC:  Chest tube per thoracic surgery. F/U path from fluid. Abx per ID. GI f/u. Will need repeat CT as outpt.

## 2019-12-20 NOTE — PROGRESS NOTE ADULT - ASSESSMENT
Patient is a 64 year old with PMH of Depression, HLD who was admitted to [facility] for liver abscess since 12/4/19 and transferred to [facility] for w/u and treatment of possible Cholangiocarcinoma.    1) Liver abscesses   - Fluid c/s from [facility] 12/6/19 Gram neg rods; and blood c/s from 12/4/19 negative from [facility]  - s/p IR guided drainage at [facility] on 12/6/19  - Worsening leukocytosis noted; ID on board  - Continue Zosyn until 12/20  - MRI/MRCP ordered per GI  - tumor markers negative   - ID Consult appreciated     2) Lung Nodules and Right Pleural Effusion  - No respiratory distress; O2 stable on room air  - s/p IR guided drainage; chest tube placed  - f/u pleural fluid studies  - Pulmonary Consult appreciated    4) Anemia  - Likely chronic   - iron studies reviewed; ferritin > 1800  - hold venofer    5) HLD  - Continue Simvastatin    6) Depression  - Continue Venlafaxin     7) HLD  -continue statin    8) GERD  -continue PPI    9) Anxiety/Depression  -Continue Xanax and Effexor     DVT Prophylaxis - Lovenox SC Patient is a 64 year old with PMH of Depression, HLD who was admitted to [facility] for liver abscess since 12/4/19 and transferred to [facility] for w/u and treatment of possible Cholangiocarcinoma.    1) Liver abscesses   - Fluid c/s from [facility] 12/6/19 Gram neg rods; and blood c/s from 12/4/19 negative from [facility]  - s/p IR guided drainage at [facility] on 12/6/19  - Continue Zosyn; f/u end date with ID  - MRI/MRCP ordered per GI  - tumor markers negative   - f/u MRI/MRCP results  - monitor LFTS    2) Lung Nodules and Right Pleural Effusion  - No respiratory distress; O2 stable on room air  - s/p IR guided drainage and pigtail placement on 12/19  - pleural fluid exudative; f/u cytology  - Pulmonary Consult appreciated  - f/u CXR and CTS recommendations     4) Anemia  - Likely chronic   - iron studies reviewed; ferritin > 1800  - held venofer  - monitor H/H    5) HLD  - Continue Simvastatin    6) Depression  - Continue Venlafaxin     7) HLD  -continue statin    8) GERD  -continue PPI    9) Anxiety/Depression  -Continue Xanax and Effexor     DVT Prophylaxis - Lovenox SC

## 2019-12-20 NOTE — CHART NOTE - NSCHARTNOTEFT_GEN_A_CORE
Pt wore noct CPAP without any issues.  Pt stated he wears [id number] at home nocturnally - Order was given for +6cmH2O and Pt complained of not enough pressure.  Conveyed to nurse and next therapist about having the order changed for the appropriate settings for the patient.

## 2019-12-20 NOTE — PROGRESS NOTE ADULT - SUBJECTIVE AND OBJECTIVE BOX
CHIEF COMPLAINT/INTERVAL HISTORY:    Patient is a 64y old  Male who presents with a chief complaint of Pleural effusion (20 Dec 2019 16:28)    SUBJECTIVE & OBJECTIVE: Pt seen and examined at bedside. No overnight events. Patient denies any new complaints today. OOB to chair; comfortable. Pigtail in place.    ROS: No chest pain, palpitations, SOB, light headedness, dizziness, headache, nausea/vomiting, fevers/chills, abdominal pain, dysuria or increased urinary frequency.    ICU Vital Signs Last 24 Hrs  T(C): 36.7 (20 Dec 2019 15:39), Max: 36.8 (20 Dec 2019 07:46)  T(F): 98.1 (20 Dec 2019 15:39), Max: 98.2 (20 Dec 2019 07:46)  HR: 92 (20 Dec 2019 15:39) (75 - 92)  BP: 115/73 (20 Dec 2019 15:39) (109/55 - 126/72)  RR: 19 (20 Dec 2019 15:39) (18 - 20)  SpO2: 91% (20 Dec 2019 07:46) (91% - 98%)      MEDICATIONS  (STANDING):  influenza   Vaccine 0.5 milliLiter(s) IntraMuscular once  lactobacillus acidophilus 1 Tablet(s) Oral two times a day  pantoprazole    Tablet 40 milliGRAM(s) Oral before breakfast  piperacillin/tazobactam IVPB.. 3.375 Gram(s) IV Intermittent every 8 hours  simvastatin 20 milliGRAM(s) Oral at bedtime  venlafaxine XR. 150 milliGRAM(s) Oral daily    MEDICATIONS  (PRN):  acetaminophen   Tablet .. 650 milliGRAM(s) Oral every 6 hours PRN Temp greater or equal to 38C (100.4F), Mild Pain (1 - 3), Moderate Pain (4 - 6)  ALPRAZolam 0.25 milliGRAM(s) Oral three times a day PRN anxiety  melatonin 3 milliGRAM(s) Oral at bedtime PRN Insomnia  ondansetron Injectable 4 milliGRAM(s) IV Push every 6 hours PRN Nausea and/or Vomiting  oxyCODONE    IR 5 milliGRAM(s) Oral every 4 hours PRN Severe Pain (7 - 10)      LABS:                        9.6    13.13 )-----------( 444      ( 20 Dec 2019 08:57 )             31.7     12-20    134<L>  |  96<L>  |  12.0  ----------------------------<  92  4.4   |  24.0  |  0.81    Ca    9.8      20 Dec 2019 08:57  Phos  4.1     12-20  Mg     2.1     12-20    TPro  7.7  /  Alb  x   /  TBili  x   /  DBili  x   /  AST  x   /  ALT  x   /  AlkPhos  x   12-20      PHYSICAL EXAM:    GENERAL: elderly   HEAD:  Atraumatic, Normocephalic  EYES: EOMI, PERRLA, conjunctiva and sclera clear  ENMT: Moist mucous membranes  NECK: Supple   NERVOUS SYSTEM:  Alert & Oriented X3, Motor Strength 5/5 B/L upper and lower extremities; DTRs 2+ intact and symmetric  CHEST/LUNG: diminished breath sounds; + right pigtail  HEART: Regular rate and rhythm; + S1/S2  ABDOMEN: Soft, Nontender, Nondistended; Bowel sounds present, + drains  EXTREMITIES:  trace pedal edema

## 2019-12-20 NOTE — PROGRESS NOTE ADULT - ASSESSMENT
64 yr old with PMHx of ROSELINE on CPAP at home, Depression, anxiety, HLD; found to have hepatic abscesses and right loculated pleural effusion s/p IR R PTC 12/19. Noted to have MATTHEW nodule (1.1 cm), decreasing in size since since earlier this month. Pleural fluid cultures, chem, and cyto sent.

## 2019-12-20 NOTE — PROGRESS NOTE ADULT - SUBJECTIVE AND OBJECTIVE BOX
Patient is a 64y old  Male who presents with a chief complaint of liver abscesses (19 Dec 2019 18:28)      HPI:  64 yr old with PMH of Depression, HLD was complaining of lethargy, wt loss 10 lb, fever and chills for some time.  Patient has no fever yesterday . No abdominal pain. Tolerating diet. Tumor markers negative.         Denies fever/ pain/ n/v    SH- former smoker, current alcohol use, current marijuana use  FH- M-  - leukemia; F- alive (17 Dec 2019 17:40)      REVIEW OF SYSTEMS:  Constitutional: No fever, weight loss or fatigue  ENMT:  No difficulty hearing, tinnitus, vertigo; No sinus or throat pain  Respiratory: No cough, wheezing, chills or hemoptysis  Cardiovascular: No chest pain, palpitations, dizziness or leg swelling  Gastrointestinal: No abdominal or epigastric pain. No nausea, vomiting or hematemesis; No diarrhea or constipation. No melena or hematochezia.  Skin: No itching, burning, rashes or lesions   Musculoskeletal: No joint pain or swelling; No muscle, back or extremity pain    PAST MEDICAL & SURGICAL HISTORY:  Cholesterol depletion  Depression      FAMILY HISTORY:      SOCIAL HISTORY:  Smoking Status: [ ] Current, [ ] Former, [ ] Never  Pack Years:  [  ] EtOH  [  ] IVDA    MEDICATIONS:  MEDICATIONS  (STANDING):  influenza   Vaccine 0.5 milliLiter(s) IntraMuscular once  lactobacillus acidophilus 1 Tablet(s) Oral two times a day  pantoprazole    Tablet 40 milliGRAM(s) Oral before breakfast  piperacillin/tazobactam IVPB.. 3.375 Gram(s) IV Intermittent every 8 hours  simvastatin 20 milliGRAM(s) Oral at bedtime  venlafaxine XR. 150 milliGRAM(s) Oral daily    MEDICATIONS  (PRN):  acetaminophen   Tablet .. 650 milliGRAM(s) Oral every 6 hours PRN Temp greater or equal to 38C (100.4F), Mild Pain (1 - 3), Moderate Pain (4 - 6)  ALPRAZolam 0.25 milliGRAM(s) Oral three times a day PRN anxiety  melatonin 3 milliGRAM(s) Oral at bedtime PRN Insomnia  ondansetron Injectable 4 milliGRAM(s) IV Push every 6 hours PRN Nausea and/or Vomiting  oxyCODONE    IR 5 milliGRAM(s) Oral every 4 hours PRN Severe Pain (7 - 10)      Allergies    No Known Allergies    Intolerances        Vital Signs Last 24 Hrs  T(C): 36.8 (20 Dec 2019 07:46), Max: 36.8 (19 Dec 2019 10:32)  T(F): 98.2 (20 Dec 2019 07:46), Max: 98.2 (19 Dec 2019 10:32)  HR: 75 (20 Dec 2019 07:46) (75 - 100)  BP: 109/55 (20 Dec 2019 07:46) (109/55 - 137/88)  BP(mean): --  RR: 20 (20 Dec 2019 07:46) (18 - 20)  SpO2: 91% (20 Dec 2019 07:46) (91% - 98%)     @ 07:01  -   @ 07:00  --------------------------------------------------------  IN: 0 mL / OUT: 102.5 mL / NET: -102.5 mL          PHYSICAL EXAM:    General: Well developed; well nourished; in no acute distress  HEENT: MMM, conjunctiva and sclera clear  H- RRR  L- CTA  Gastrointestinal: Soft, non-tender non-distended; Normal bowel sounds; No rebound or guarding  Extremities: Normal range of motion, No clubbing, cyanosis or edema  Neurological: Alert and oriented x3  Skin: Warm and dry. No obvious rash      LABS:                        10.1   16.77 )-----------( 517      ( 19 Dec 2019 10:10 )             33.2     19 Dec 2019 10:10    137    |  100    |  9.0    ----------------------------<  142    4.6     |  23.0   |  0.86     Ca    9.6        19 Dec 2019 10:10    TPro  7.8    /  Alb  3.2    /  TBili  0.4    /  DBili  0.1    /  AST  18     /  ALT  18     /  AlkPhos  127    / Amylase x      /Lipase x      19 Dec 2019 10:10      Fluid Segmented Granulocytes: N/A % ( @ 16:01)          RADIOLOGY & ADDITIONAL STUDIES:     < from: CT Abdomen and Pelvis w/ Oral Cont and w/ IV Cont (19 @ 20:52) >  IMPRESSION:     2 drainage catheters in the liver which terminate and fluid collections   measuring 3.3 x 2.1 cm and 3.2 x 1.4 cm.    Additional linear foci of low attenuation in the right hepatic lobe,   either additional areas of fluid or dilated intrahepatic biliary radicles    < end of copied text >

## 2019-12-20 NOTE — PROGRESS NOTE ADULT - ASSESSMENT
65y/o  Male  who was transferred from [facility] to [facility]. Patient was initially admitted to [facility] for fever, chills, leukocytosis, was thought to have pneumonia and was also found to have a liver lesion. He had blood cultures which were negative. IR drain was placed which showed liver abscess. Cultures from the abscess with GNR anaerobic. He was maintained on Vancomycin and Zosyn at [facility]. Here patient has been afebrile. Had repeat imaging. Seen by GI. Maintained on Zosyn.      Hepatic abscess  s/p IR drainage at [facility]  Exudative Pleural Effusion   s/p Chest Tube 12/19/19  Hepatic abscess Culture with Gram negative rods (Anaerobic) - Limited growth and not able to be identify       - Pleural fluid culture pending  - Pleural fluid is exudative   - [facility] blood cultures negative  - Hepatic abscess cultures Gram negative rods (Anaerobic) - Limited growth and not able to be identify   - CT A/P with hepatic abscess with drain  - Continue Zosyn  - Thoracic surgery consult noted   - GI consult noted  - Trend Fever  - Trend Leukocytosis      Will Follow

## 2019-12-20 NOTE — PROGRESS NOTE ADULT - SUBJECTIVE AND OBJECTIVE BOX
Brief Hospital Course:   : Transfer [facility] for ERCP (Found to hepatic abscess and rt loculated pleural effusion-s/p IR Rt 12F PTC). Noted to have MATTHEW nodule (1.1cm-decreasing size since prior study 1.9cm)    Significant recent/past 24 hr events:  No acute overnight events. Chest tube 90cc output overnight,       Subjective:    Review of Systems         Unable to obtain due to: intubated & sedated altered mental status _______________    Constitutional: denies fever, chills, general malaise, fatigue, weight loss, weight gain, diaphoresis   HEENT: denies dry mouth, sore throat, runny nose, photophobia, blurry vision, double vision, discharge, eye pain, difficulty hearing, vertigo, dysphagia, epistaxis, recent dental work    Neck: denies pain or stiffness  Breasts: denies pain, masses, or nipple discharge  Respiratory: denies SOB, GARZA, cough, phlegm, wheezing, hemoptysis  Cardiovascular: denies CP, palpitations, edema, diaphoresis   Gastrointestinal: denies nausea, vomiting or hematemesis, diarrhea, constipation, abdominal or epigastric pain, melena or hematochezia   Genitourinary: denies dysuria, frequency, urgency, incontinence, hematuria   Skin: denies rash, hives, itching, burning, masses  Musculoskeletal: denies myalgias, arthritis, joint swelling, muscle weakness  Neurologic: denies syncope, LOC, headache, weakness, dizziness, parasthesias, numbness, seizures, confusion, dementia   Psychiatric: denies feeling anxious, depressed, suicidal, homicidal  Endocrine: denies increased fingerstick glucoses, cold or heat intolerance, polydipsia, polyuria, polyphagia, hair loss  Hematology/Oncology: denies bruising, tender or enlarged lymph nodes   Allergy/immunologic: denies hives or eczema  ROS negative x 10 systems except as noted above      Patient is a 64y old  Male who presents with a chief complaint of fever (20 Dec 2019 08:18)    HPI:  64 yr old with PMH of Depression, HLD was complaining of lethargy, wt loss 10 lb, fever and chills for some time. He did not initially pursue any help as his dog was sick. Then he went to his PCP and noted to have abnormal labs. He went to [facility] on 19. Was noted to have Leucocytosis, thrombocytosis, Ct imaging showing Rt hepatic lobe massess, pulm nodules concerning for malig/ metastasis, Cholelithiasis without cholecystitis. Hepatic abscess suspected and 2 drains inserted by IR. Fluid c/s from 19 positive for Gram neg rods. Blood c/s neg to date. Was treated with Vanc/ Zosyn/ Flagyl. 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 Tumor markers normal.   Denies fever/ pain/ n/v    SH- former smoker, current alcohol use, current marijuana use  FH- M-  - leukemia; F- alive (17 Dec 2019 17:40)    PAST MEDICAL & SURGICAL HISTORY:  Cholesterol depletion  Depression    FAMILY HISTORY:      Vitals   ICU Vital Signs Last 24 Hrs  T(C): 36.7 (20 Dec 2019 15:39), Max: 36.8 (20 Dec 2019 07:46)  T(F): 98.1 (20 Dec 2019 15:39), Max: 98.2 (20 Dec 2019 07:46)  HR: 92 (20 Dec 2019 15:39) (75 - 92)  BP: 115/73 (20 Dec 2019 15:39) (109/55 - 126/72)  BP(mean): --  ABP: --  ABP(mean): --  RR: 19 (20 Dec 2019 15:39) (18 - 20)  SpO2: 91% (20 Dec 2019 07:46) (91% - 98%)      VENT SETTINGS         I&O's Detail    19 Dec 2019 07:01  -  20 Dec 2019 07:00  --------------------------------------------------------  IN:  Total IN: 0 mL    OUT:    Chest Tube: 90 mL    Drain: 10 mL    Drain: 2.5 mL  Total OUT: 102.5 mL    Total NET: -102.5 mL          LABS                        9.6    13.13 )-----------( 444      ( 20 Dec 2019 08:57 )             31.7     12-20    134<L>  |  96<L>  |  12.0  ----------------------------<  92  4.4   |  24.0  |  0.81    Ca    9.8      20 Dec 2019 08:57  Phos  4.1     12-20  Mg     2.1     12-20    TPro  7.7  /  Alb  x   /  TBili  x   /  DBili  x   /  AST  x   /  ALT  x   /  AlkPhos  x   12-20    LIVER FUNCTIONS - ( 20 Dec 2019 08:57 )  Alb: x     / Pro: 7.7 g/dL / ALK PHOS: x     / ALT: x     / AST: x     / GGT: x                           MEDICATIONS  (STANDING):  influenza   Vaccine 0.5 milliLiter(s) IntraMuscular once  lactobacillus acidophilus 1 Tablet(s) Oral two times a day  pantoprazole    Tablet 40 milliGRAM(s) Oral before breakfast  piperacillin/tazobactam IVPB.. 3.375 Gram(s) IV Intermittent every 8 hours  simvastatin 20 milliGRAM(s) Oral at bedtime  venlafaxine XR. 150 milliGRAM(s) Oral daily    MEDICATIONS  (PRN):  acetaminophen   Tablet .. 650 milliGRAM(s) Oral every 6 hours PRN Temp greater or equal to 38C (100.4F), Mild Pain (1 - 3), Moderate Pain (4 - 6)  ALPRAZolam 0.25 milliGRAM(s) Oral three times a day PRN anxiety  melatonin 3 milliGRAM(s) Oral at bedtime PRN Insomnia  ondansetron Injectable 4 milliGRAM(s) IV Push every 6 hours PRN Nausea and/or Vomiting  oxyCODONE    IR 5 milliGRAM(s) Oral every 4 hours PRN Severe Pain (7 - 10)      Allergies:  No Known Allergies        Physical Exam:   Constitutional: NAD, well-groomed, well-developed  HEENT: PERRLA, EOMI, no drainage or redness  Neck: supple,  No JVD  Back: Normal spine flexure, No CVA tenderness, No deformity or limitation of movement  Respiratory: Breath Sounds equal & clear bilaterally to auscultation, no accessory muscle use noted  Cardiovascular: Regular rate, regular rhythm, normal S1, S2; no murmurs or rub  Gastrointestinal: Soft, non-tender, non distended, no hepatosplenomegaly, normal bowel sounds  Extremities: VELASQUEZ x 4, no peripheral edema, no cyanosis, no clubbing   Vascular: Equal and normal pulses: 2+ peripheral pulses throughout  Neurological: A+O x 3; speech clear and intact; no sensory, motor  deficits, normal reflexes  Psychiatric: calm, normal mood, normal affect  Musculoskeletal: No joint swelling or deformity; no limitation of movement  Skin: warm, dry, well perfused, no rashes      Code Status:       Critical care time spent: ____minutes (reviewing chart including medication, labs and imaging results, discussions with interdisciplinary team, discussing goals of care/advanced directives, counseling patient and/or family, non-inclusive of procedures)    Case including assessment/plan of care discussed with                    [facility] attending. Brief Hospital Course:   : Transfer [facility] for ERCP (Found to hepatic abscess & rt loculated pleural effusion-s/p IR Rt 12F PTC). Noted to have MATTHEW nodule (1.1cm-decreasing size since prior study 1.9cm)    Significant recent/past 24 hr events:  No acute overnight events. Chest tube 90cc output overnight, H20 seal. Pt currently in NAD and without acute complaints. Denies N/V/D HA, dizziness, blurry vision, numbness/tingling, SOB, cough, chest pain, palpitations, abd pain or urinary sx's.       Subjective:    Review of Systems         Unable to obtain due to: intubated & sedated altered mental status _______________    Constitutional: denies fever, chills, general malaise, fatigue, weight loss, weight gain, diaphoresis   HEENT: denies dry mouth, sore throat, runny nose, photophobia, blurry vision, double vision, discharge, eye pain, difficulty hearing, vertigo, dysphagia, epistaxis, recent dental work    Neck: denies pain or stiffness  Breasts: denies pain, masses, or nipple discharge  Respiratory: denies SOB, GARZA, cough, phlegm, wheezing, hemoptysis  Cardiovascular: denies CP, palpitations, edema, diaphoresis   Gastrointestinal: denies nausea, vomiting or hematemesis, diarrhea, constipation, abdominal or epigastric pain, melena or hematochezia   Genitourinary: denies dysuria, frequency, urgency, incontinence, hematuria   Skin: denies rash, hives, itching, burning, masses  Musculoskeletal: denies myalgias, arthritis, joint swelling, muscle weakness  Neurologic: denies syncope, LOC, headache, weakness, dizziness, parasthesias, numbness, seizures, confusion, dementia   Psychiatric: denies feeling anxious, depressed, suicidal, homicidal  Endocrine: denies increased fingerstick glucoses, cold or heat intolerance, polydipsia, polyuria, polyphagia, hair loss  Hematology/Oncology: denies bruising, tender or enlarged lymph nodes   Allergy/immunologic: denies hives or eczema  ROS negative x 10 systems except as noted above      Patient is a 64y old  Male who presents with a chief complaint of fever (20 Dec 2019 08:18)    HPI:  64 yr old with PMH of Depression, HLD was complaining of lethargy, wt loss 10 lb, fever and chills for some time. He did not initially pursue any help as his dog was sick. Then he went to his PCP and noted to have abnormal labs. He went to [facility] on 19. Was noted to have Leucocytosis, thrombocytosis, Ct imaging showing Rt hepatic lobe massess, pulm nodules concerning for malig/ metastasis, Cholelithiasis without cholecystitis. Hepatic abscess suspected and 2 drains inserted by IR. Fluid c/s from 19 positive for Gram neg rods. Blood c/s neg to date. Was treated with Vanc/ Zosyn/ Flagyl. 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 Tumor markers normal.   Denies fever/ pain/ n/v    SH- former smoker, current alcohol use, current marijuana use  FH- M-  - leukemia; F- alive (17 Dec 2019 17:40)    PAST MEDICAL & SURGICAL HISTORY:  Cholesterol depletion  Depression    FAMILY HISTORY:      Vitals   ICU Vital Signs Last 24 Hrs  T(C): 36.7 (20 Dec 2019 15:39), Max: 36.8 (20 Dec 2019 07:46)  T(F): 98.1 (20 Dec 2019 15:39), Max: 98.2 (20 Dec 2019 07:46)  HR: 92 (20 Dec 2019 15:39) (75 - 92)  BP: 115/73 (20 Dec 2019 15:39) (109/55 - 126/72)  BP(mean): --  ABP: --  ABP(mean): --  RR: 19 (20 Dec 2019 15:39) (18 - 20)  SpO2: 91% (20 Dec 2019 07:46) (91% - 98%)      VENT SETTINGS         I&O's Detail    19 Dec 2019 07:01  -  20 Dec 2019 07:00  --------------------------------------------------------  IN:  Total IN: 0 mL    OUT:    Chest Tube: 90 mL    Drain: 10 mL    Drain: 2.5 mL  Total OUT: 102.5 mL    Total NET: -102.5 mL          LABS                        9.6    13.13 )-----------( 444      ( 20 Dec 2019 08:57 )             31.7     12-20    134<L>  |  96<L>  |  12.0  ----------------------------<  92  4.4   |  24.0  |  0.81    Ca    9.8      20 Dec 2019 08:57  Phos  4.1     12-  Mg     2.1         TPro  7.7  /  Alb  x   /  TBili  x   /  DBili  x   /  AST  x   /  ALT  x   /  AlkPhos  x   12-    LIVER FUNCTIONS - ( 20 Dec 2019 08:57 )  Alb: x     / Pro: 7.7 g/dL / ALK PHOS: x     / ALT: x     / AST: x     / GGT: x                           MEDICATIONS  (STANDING):  influenza   Vaccine 0.5 milliLiter(s) IntraMuscular once  lactobacillus acidophilus 1 Tablet(s) Oral two times a day  pantoprazole    Tablet 40 milliGRAM(s) Oral before breakfast  piperacillin/tazobactam IVPB.. 3.375 Gram(s) IV Intermittent every 8 hours  simvastatin 20 milliGRAM(s) Oral at bedtime  venlafaxine XR. 150 milliGRAM(s) Oral daily    MEDICATIONS  (PRN):  acetaminophen   Tablet .. 650 milliGRAM(s) Oral every 6 hours PRN Temp greater or equal to 38C (100.4F), Mild Pain (1 - 3), Moderate Pain (4 - 6)  ALPRAZolam 0.25 milliGRAM(s) Oral three times a day PRN anxiety  melatonin 3 milliGRAM(s) Oral at bedtime PRN Insomnia  ondansetron Injectable 4 milliGRAM(s) IV Push every 6 hours PRN Nausea and/or Vomiting  oxyCODONE    IR 5 milliGRAM(s) Oral every 4 hours PRN Severe Pain (7 - 10)      Allergies:  No Known Allergies        Physical Exam:   Constitutional: NAD, well-groomed, well-developed  HEENT: PERRLA, EOMI, no drainage or redness  Neck: supple,  No JVD  Back: Normal spine flexure, No CVA tenderness, No deformity or limitation of movement  Respiratory: Breath Sounds equal & clear bilaterally to auscultation, no accessory muscle use noted  Cardiovascular: Regular rate, regular rhythm, normal S1, S2; no murmurs or rub  Gastrointestinal: Soft, non-tender, non distended, no hepatosplenomegaly, normal bowel sounds  Extremities: VELASQUEZ x 4, no peripheral edema, no cyanosis, no clubbing   Vascular: Equal and normal pulses: 2+ peripheral pulses throughout  Neurological: A+O x 3; speech clear and intact; no sensory, motor  deficits, normal reflexes  Psychiatric: calm, normal mood, normal affect  Musculoskeletal: No joint swelling or deformity; no limitation of movement  Skin: warm, dry, well perfused, no rashes      Code Status:       Critical care time spent: ____minutes (reviewing chart including medication, labs and imaging results, discussions with interdisciplinary team, discussing goals of care/advanced directives, counseling patient and/or family, non-inclusive of procedures)    Case including assessment/plan of care discussed with                    [facility] attending. Brief Hospital Course:   : [place] [facility] [place]. Found to hepatic abscess & rt loculated pleural effusion-s/p IR Rt 12F PTC. Also noted to have MATTHEW nodule (1.1cm-decreasing size since prior study 1.9cm)    Significant recent/past 24 hr events:  No acute overnight events. Chest tube 90cc output overnight, H20 seal, no gross airleaks. Pt currently in NAD and without acute complaints. Denies N/V/D HA, dizziness, blurry vision, numbness/tingling, SOB, cough, chest pain, palpitations, abd pain or urinary sx's.     Subjective:  Review of Systems       ROS negative x 10 systems except as noted above    Patient is a 64y old  Male who presents with a chief complaint of fever (20 Dec 2019 08:18)    HPI:  64 yr old with PMH of Depression, HLD was complaining of lethargy, wt loss 10 lb, fever and chills for some time. He did not initially pursue any help as his dog was sick. Then he went to his PCP and noted to have abnormal labs. [place] [facility] [place]. Was noted to have Leucocytosis, thrombocytosis, Ct imaging showing Rt hepatic lobe massess, pulm nodules concerning for malig/ metastasis, Cholelithiasis without cholecystitis. Hepatic abscess suspected and 2 drains inserted by IR. Fluid c/s from 19 positive for Gram neg rods. Blood c/s neg to date. Was treated with Vanc/ Zosyn/ Flagyl. 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 Tumor markers normal.   Denies fever/ pain/ n/v    SH- former smoker, current alcohol use, current marijuana use  FH- M-  - leukemia; F- alive (17 Dec 2019 17:40)    PAST MEDICAL & SURGICAL HISTORY:  Cholesterol depletion  Depression    Vitals   ICU Vital Signs Last 24 Hrs  T(C): 36.7 (20 Dec 2019 15:39), Max: 36.8 (20 Dec 2019 07:46)  T(F): 98.1 (20 Dec 2019 15:39), Max: 98.2 (20 Dec 2019 07:46)  HR: 92 (20 Dec 2019 15:39) (75 - 92)  BP: 115/73 (20 Dec 2019 15:39) (109/55 - 126/72)  BP(mean): --  ABP: --  ABP(mean): --  RR: 19 (20 Dec 2019 15:39) (18 - 20)  SpO2: 91% (20 Dec 2019 07:46) (91% - 98%)    I&O's Detail    19 Dec 2019 07:01  -  20 Dec 2019 07:00  --------------------------------------------------------  IN:  Total IN: 0 mL    OUT:    Chest Tube: 90 mL    Drain: 10 mL    Drain: 2.5 mL  Total OUT: 102.5 mL    Total NET: -102.5 mL    LABS                        9.6    13.13 )-----------( 444      ( 20 Dec 2019 08:57 )             31.7     12-20    134<L>  |  96<L>  |  12.0  ----------------------------<  92  4.4   |  24.0  |  0.81    Ca    9.8      20 Dec 2019 08:57  Phos  4.1     12-20  Mg     2.1     12-20    TPro  7.7  /  Alb  x   /  TBili  x   /  DBili  x   /  AST  x   /  ALT  x   /  AlkPhos  x   12-20    LIVER FUNCTIONS - ( 20 Dec 2019 08:57 )  Alb: x     / Pro: 7.7 g/dL / ALK PHOS: x     / ALT: x     / AST: x     / GGT: x             MEDICATIONS  (STANDING):  influenza   Vaccine 0.5 milliLiter(s) IntraMuscular once  lactobacillus acidophilus 1 Tablet(s) Oral two times a day  pantoprazole    Tablet 40 milliGRAM(s) Oral before breakfast  piperacillin/tazobactam IVPB.. 3.375 Gram(s) IV Intermittent every 8 hours  simvastatin 20 milliGRAM(s) Oral at bedtime  venlafaxine XR. 150 milliGRAM(s) Oral daily    MEDICATIONS  (PRN):  acetaminophen   Tablet .. 650 milliGRAM(s) Oral every 6 hours PRN Temp greater or equal to 38C (100.4F), Mild Pain (1 - 3), Moderate Pain (4 - 6)  ALPRAZolam 0.25 milliGRAM(s) Oral three times a day PRN anxiety  melatonin 3 milliGRAM(s) Oral at bedtime PRN Insomnia  ondansetron Injectable 4 milliGRAM(s) IV Push every 6 hours PRN Nausea and/or Vomiting  oxyCODONE    IR 5 milliGRAM(s) Oral every 4 hours PRN Severe Pain (7 - 10)    Allergies:  No Known Allergies    Physical Exam:   Constitutional: NAD  Neck: supple,  No JVD. Trachea Midline  Respiratory: Breath Sounds equal & clear bilaterally to auscultation, no accessory muscle use noted  Cardiovascular: Regular rate, regular rhythm, normal S1, S2; no murmurs or rub  Gastrointestinal: Soft, non-tender, non distended, normal bowel sounds  Extremities: VELASQUEZ x 4, no peripheral edema, no cyanosis, no clubbing   Vascular: Equal and normal pulses: 2+ peripheral pulses throughout  Neurological: A+O x 3; speech clear and intact; no gross sensory/motor deficits  Skin: warm, dry, well perfused, no rashes  Tubes/Lines: Rt pleural CT-No obvious air leak noted. Incision D/I   procedures)

## 2019-12-21 LAB
ALBUMIN SERPL ELPH-MCNC: 3.1 G/DL — LOW (ref 3.3–5.2)
ALP SERPL-CCNC: 103 U/L — SIGNIFICANT CHANGE UP (ref 40–120)
ALT FLD-CCNC: 16 U/L — SIGNIFICANT CHANGE UP
AST SERPL-CCNC: 15 U/L — SIGNIFICANT CHANGE UP
BILIRUB DIRECT SERPL-MCNC: 0.1 MG/DL — SIGNIFICANT CHANGE UP (ref 0–0.3)
BILIRUB INDIRECT FLD-MCNC: 0.2 MG/DL — SIGNIFICANT CHANGE UP (ref 0.2–1)
BILIRUB SERPL-MCNC: 0.3 MG/DL — LOW (ref 0.4–2)
PROT SERPL-MCNC: 7.1 G/DL — SIGNIFICANT CHANGE UP (ref 6.6–8.7)

## 2019-12-21 PROCEDURE — 99233 SBSQ HOSP IP/OBS HIGH 50: CPT

## 2019-12-21 PROCEDURE — 99232 SBSQ HOSP IP/OBS MODERATE 35: CPT

## 2019-12-21 RX ORDER — DIPHENHYDRAMINE HCL 50 MG
25 CAPSULE ORAL EVERY 6 HOURS
Refills: 0 | Status: DISCONTINUED | OUTPATIENT
Start: 2019-12-21 | End: 2019-12-24

## 2019-12-21 RX ADMIN — ENOXAPARIN SODIUM 40 MILLIGRAM(S): 100 INJECTION SUBCUTANEOUS at 11:49

## 2019-12-21 RX ADMIN — SIMVASTATIN 20 MILLIGRAM(S): 20 TABLET, FILM COATED ORAL at 21:34

## 2019-12-21 RX ADMIN — Medication 150 MILLIGRAM(S): at 11:49

## 2019-12-21 RX ADMIN — PANTOPRAZOLE SODIUM 40 MILLIGRAM(S): 20 TABLET, DELAYED RELEASE ORAL at 06:34

## 2019-12-21 RX ADMIN — OXYCODONE HYDROCHLORIDE 5 MILLIGRAM(S): 5 TABLET ORAL at 05:00

## 2019-12-21 RX ADMIN — PIPERACILLIN AND TAZOBACTAM 25 GRAM(S): 4; .5 INJECTION, POWDER, LYOPHILIZED, FOR SOLUTION INTRAVENOUS at 15:09

## 2019-12-21 RX ADMIN — Medication 1 TABLET(S): at 06:34

## 2019-12-21 RX ADMIN — OXYCODONE HYDROCHLORIDE 5 MILLIGRAM(S): 5 TABLET ORAL at 10:07

## 2019-12-21 RX ADMIN — OXYCODONE HYDROCHLORIDE 5 MILLIGRAM(S): 5 TABLET ORAL at 11:11

## 2019-12-21 RX ADMIN — OXYCODONE HYDROCHLORIDE 5 MILLIGRAM(S): 5 TABLET ORAL at 20:00

## 2019-12-21 RX ADMIN — Medication 0.25 MILLIGRAM(S): at 21:34

## 2019-12-21 RX ADMIN — OXYCODONE HYDROCHLORIDE 5 MILLIGRAM(S): 5 TABLET ORAL at 04:27

## 2019-12-21 RX ADMIN — OXYCODONE HYDROCHLORIDE 5 MILLIGRAM(S): 5 TABLET ORAL at 19:22

## 2019-12-21 RX ADMIN — PIPERACILLIN AND TAZOBACTAM 25 GRAM(S): 4; .5 INJECTION, POWDER, LYOPHILIZED, FOR SOLUTION INTRAVENOUS at 06:33

## 2019-12-21 RX ADMIN — PIPERACILLIN AND TAZOBACTAM 25 GRAM(S): 4; .5 INJECTION, POWDER, LYOPHILIZED, FOR SOLUTION INTRAVENOUS at 21:34

## 2019-12-21 RX ADMIN — Medication 25 MILLIGRAM(S): at 17:32

## 2019-12-21 RX ADMIN — Medication 3 MILLIGRAM(S): at 21:34

## 2019-12-21 RX ADMIN — Medication 0.25 MILLIGRAM(S): at 13:40

## 2019-12-21 RX ADMIN — Medication 1 TABLET(S): at 17:10

## 2019-12-21 NOTE — PROGRESS NOTE ADULT - SUBJECTIVE AND OBJECTIVE BOX
HPI: 65 yo WM who is an established with Dr. Loaiza at [facility] who was initially seen a [facility]  with high fevers 10 pound weight loss with leukocytosis and thrombocytosis.  CT scan at that time were concerning for right  hepatic lobe lesions and pulmonary nocules concerning for possible metastases. Hepatic abscess were suspected and 2 drains inserted by IR. Fluid cultures  from 12/6/19 positive for Gram neg rods. He has been treated with IV abx with Vanc/ Zosyn/ Flagyl. Repeat abdominal MRI yesterday demonstrated 2 fluid collections but report does not seemed to be as concerned for cholangiocarcinoma.   Patient also had right sided pleural effusion and a chest tube has been placed which demonstyrated an exudate. Right lung nodule is decreasing in size    The patient reprots he is feeling better and is afebrile.    PAST MEDICAL & SURGICAL HISTORY:  Cholesterol depletion  Depression      MEDICATIONS  (STANDING):  enoxaparin Injectable 40 milliGRAM(s) SubCutaneous daily  influenza   Vaccine 0.5 milliLiter(s) IntraMuscular once  lactobacillus acidophilus 1 Tablet(s) Oral two times a day  pantoprazole    Tablet 40 milliGRAM(s) Oral before breakfast  piperacillin/tazobactam IVPB.. 3.375 Gram(s) IV Intermittent every 8 hours  simvastatin 20 milliGRAM(s) Oral at bedtime  venlafaxine XR. 150 milliGRAM(s) Oral daily      CONSTITUTIONAL: The patient appears well nourished and is in no apparent distress  EYES: EOMI, no scleral icterus, conjunctiva clear,  EARS, NOSE, MOUTH, THROAT: no nasal discharge, oropharynx clear without erythema or ulceration  NECK: no thyromegaly, no cervical lymphadenopathy appreciated  PULMONARY: right chest tube in place with decreased breath sounds  CARDIOVASCULAR: normal sinus rhythm, no murmurs, rubs or gallops, no peripheral edema  GASTROINTESTINAL: soft, nontender, nondistended, normoactive bowel sounds, drain in place  MUSCULOSKELETAL: no spinal tenderness to palpation. no joint swelling.   EXTREMITIES: No digital cyanosis or clubbing  LYMPH NODES: no cervical, no supraclavicular, axillary or inguinal lymphadenopathy  SKIN: no rashes, lesions, ulcers or bruising  PSYCH: alert and oriented x 3, appropriate affect                9.6                  134  | 24.0 | 12.0         13.13 >-----------< 444     ------------------------< 92                    31.7                 4.4  | 96   | 0.81                                         Ca 9.8   Mg 2.1   Ph 4.1

## 2019-12-21 NOTE — PROGRESS NOTE ADULT - ASSESSMENT
Patient with multiple liver abscesses on pip/tazo.  Source and organism unknown.  Will need eventual colonoscopy to rule out colonic neoplasm.  Supportive care per primary team.  Antibiosis per ID.  Will follow.    Thank you for the consult.  Please do not hesitate to call with any questions or concerns.    ZURDO Hickey MD  [facility]  Division of Gastroenterology  Tel [phone]  Fax [phone]  12-21-19 @ 17:23

## 2019-12-21 NOTE — PROGRESS NOTE ADULT - SUBJECTIVE AND OBJECTIVE BOX
Brief Hospital Course:   : Transfer [facility] for ERCP. Found to hepatic abscess & rt loculated pleural effusion-s/p IR Rt 12F PTC. Also noted to have MATTHEW nodule (1.1cm-decreasing size since prior study 1.9cm)    Significant recent/past 24 hr events:  No acute overnight events. Chest tube 40cc output overnight, H20 seal, no gross airleaks noted. Pt currently in NAD and without acute complaints. Denies N/V/D HA, dizziness, blurry vision, numbness/tingling, SOB, cough, chest pain, palpitations, abd pain or urinary sx's.    Subjective:  Review of Systems  ROS negative x 10 systems except as noted above    Patient is a 64y old  Male who presents with a chief complaint of Liver abscesses (21 Dec 2019 17:20)    HPI:  64 yr old with PMH of Depression, HLD was complaining of lethargy, wt loss 10 lb, fever and chills for some time. He did not initially pursue any help as his dog was sick. Then he went to his PCP and noted to have abnormal labs. He went to [facility] on 19. Was noted to have Leucocytosis, thrombocytosis, Ct imaging showing Rt hepatic lobe massess, pulm nodules concerning for malig/ metastasis, Cholelithiasis without cholecystitis. Hepatic abscess suspected and 2 drains inserted by IR. Fluid c/s from 19 positive for Gram neg rods. Blood c/s neg to date. Was treated with Vanc/ Zosyn/ Flagyl. 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 Tumor markers normal.   Denies fever/ pain/ n/v    SH- former smoker, current alcohol use, current marijuana use  FH- M-  - leukemia; F- alive (17 Dec 2019 17:40)    PAST MEDICAL & SURGICAL HISTORY:  Cholesterol depletion  Depression    Vitals   ICU Vital Signs Last 24 Hrs  T(C): 36.6 (21 Dec 2019 14:59), Max: 36.6 (21 Dec 2019 14:59)  T(F): 97.8 (21 Dec 2019 14:59), Max: 97.8 (21 Dec 2019 14:59)  HR: 91 (21 Dec 2019 14:59) (80 - 91)  BP: 115/77 (21 Dec 2019 14:59) (115/77 - 144/70)  BP(mean): --  ABP: --  ABP(mean): --  RR: 19 (21 Dec 2019 14:59) (17 - 19)  SpO2: 98% (21 Dec 2019 14:59) (95% - 98%)    I&O's Detail    20 Dec 2019 07:01  -  21 Dec 2019 07:00  --------------------------------------------------------  IN:  Total IN: 0 mL    OUT:    Chest Tube: 240 mL    Drain: 5 mL    Drain: 5 mL  Total OUT: 250 mL    Total NET: -250 mL      21 Dec 2019 07:01  -  21 Dec 2019 19:19  --------------------------------------------------------  IN:  Total IN: 0 mL    OUT:    Chest Tube: 25 mL    Drain: 10 mL    Drain: 5 mL  Total OUT: 40 mL    Total NET: -40 mL    LABS                        9.6    13.13 )-----------( 444      ( 20 Dec 2019 08:57 )             31.7     12-    134<L>  |  96<L>  |  12.0  ----------------------------<  92  4.4   |  24.0  |  0.81    Ca    9.8      20 Dec 2019 08:57  Phos  4.1     12-  Mg     2.1     -    TPro  7.1  /  Alb  3.1<L>  /  TBili  0.3<L>  /  DBili  0.1  /  AST  15  /  ALT  16  /  AlkPhos  103  12-    LIVER FUNCTIONS - ( 21 Dec 2019 08:17 )  Alb: 3.1 g/dL / Pro: 7.1 g/dL / ALK PHOS: 103 U/L / ALT: 16 U/L / AST: 15 U/L / GGT: x             MEDICATIONS  (STANDING):  enoxaparin Injectable 40 milliGRAM(s) SubCutaneous daily  influenza   Vaccine 0.5 milliLiter(s) IntraMuscular once  lactobacillus acidophilus 1 Tablet(s) Oral two times a day  pantoprazole    Tablet 40 milliGRAM(s) Oral before breakfast  piperacillin/tazobactam IVPB.. 3.375 Gram(s) IV Intermittent every 8 hours  simvastatin 20 milliGRAM(s) Oral at bedtime  venlafaxine XR. 150 milliGRAM(s) Oral daily    MEDICATIONS  (PRN):  acetaminophen   Tablet .. 650 milliGRAM(s) Oral every 6 hours PRN Temp greater or equal to 38C (100.4F), Mild Pain (1 - 3), Moderate Pain (4 - 6)  ALPRAZolam 0.25 milliGRAM(s) Oral three times a day PRN anxiety  diphenhydrAMINE 25 milliGRAM(s) Oral every 6 hours PRN Rash and/or Itching  melatonin 3 milliGRAM(s) Oral at bedtime PRN Insomnia  ondansetron Injectable 4 milliGRAM(s) IV Push every 6 hours PRN Nausea and/or Vomiting  oxyCODONE    IR 5 milliGRAM(s) Oral every 4 hours PRN Severe Pain (7 - 10)    Allergies:  No Known Allergies    Physical Exam:   Constitutional: NAD  Neck: supple, No JVD. Trachea Midline  Respiratory: Breath Sounds equal & clear bilaterally to auscultation, no accessory muscle use noted  Cardiovascular: Regular rate, regular rhythm, normal S1, S2; no murmurs or rub  Gastrointestinal: Soft, non-tender, non distended, normal bowel sounds  Extremities: VELASQUEZ x 4, no peripheral edema, no cyanosis, no clubbing   Vascular: Equal and normal pulses: 2+ peripheral pulses throughout  Neurological: A+O x 3; speech clear and intact; no gross sensory/motor deficits  Skin: warm, dry, well perfused, no rashes  Tubes/Lines: Rt pleural CT-No obvious air leak noted. Incision D/I   procedures)

## 2019-12-21 NOTE — PROGRESS NOTE ADULT - SUBJECTIVE AND OBJECTIVE BOX
PULMONARY PROGRESS NOTE      [facility]-[id number]    Patient is a 64y old  Male who presents with a chief complaint of liver abscess (21 Dec 2019 10:13)      INTERVAL HPI/OVERNIGHT EVENTS: Less sob. Using cpap at 10 cmH2O.     MEDICATIONS  (STANDING):  enoxaparin Injectable 40 milliGRAM(s) SubCutaneous daily  influenza   Vaccine 0.5 milliLiter(s) IntraMuscular once  lactobacillus acidophilus 1 Tablet(s) Oral two times a day  pantoprazole    Tablet 40 milliGRAM(s) Oral before breakfast  piperacillin/tazobactam IVPB.. 3.375 Gram(s) IV Intermittent every 8 hours  simvastatin 20 milliGRAM(s) Oral at bedtime  venlafaxine XR. 150 milliGRAM(s) Oral daily      MEDICATIONS  (PRN):  acetaminophen   Tablet .. 650 milliGRAM(s) Oral every 6 hours PRN Temp greater or equal to 38C (100.4F), Mild Pain (1 - 3), Moderate Pain (4 - 6)  ALPRAZolam 0.25 milliGRAM(s) Oral three times a day PRN anxiety  melatonin 3 milliGRAM(s) Oral at bedtime PRN Insomnia  ondansetron Injectable 4 milliGRAM(s) IV Push every 6 hours PRN Nausea and/or Vomiting  oxyCODONE    IR 5 milliGRAM(s) Oral every 4 hours PRN Severe Pain (7 - 10)      Allergies    No Known Allergies    Intolerances        PAST MEDICAL & SURGICAL HISTORY:  Cholesterol depletion  Depression             REVIEW OF SYSTEMS:    CONSTITUTIONAL:  No distress    HEENT:  Eyes:  No diplopia or blurred vision. ENT:  No earache, sore throat or runny nose.    CARDIOVASCULAR:  No pressure, squeezing, tightness, heaviness or aching about the chest; no palpitations.    RESPIRATORY:  per HPI     GASTROINTESTINAL:  hepatic abscess    GENITOURINARY:  No dysuria, frequency or urgency.    NEUROLOGIC:  No paresthesias, fasciculations, seizures or weakness.    PSYCHIATRIC:  No disorder of thought or mood.    Vital Signs Last 24 Hrs  T(C): 36.3 (21 Dec 2019 07:53), Max: 36.7 (20 Dec 2019 15:39)  T(F): 97.3 (21 Dec 2019 07:53), Max: 98.1 (20 Dec 2019 15:39)  HR: 86 (21 Dec 2019 07:53) (80 - 92)  BP: 144/70 (21 Dec 2019 07:53) (115/73 - 144/70)  BP(mean): --  RR: 17 (21 Dec 2019 07:53) (17 - 19)  SpO2: 95% (21 Dec 2019 07:53) (95% - 98%)    PHYSICAL EXAMINATION:    GENERAL: The patient is awake and alert in no apparent distress.     HEENT: Head is normocephalic and atraumatic. Mucous membranes are moist.    NECK: Supple.    LUNGS: Clear to auscultation without wheezing, rales or rhonchi; respirations unlabored    HEART: Regular rate and rhythm      ABDOMEN: Soft, nontender, and nondistended.      EXTREMITIES: Without any cyanosis, clubbing, rash, lesions or edema.    NEUROLOGIC: Grossly intact.    LABS:                        9.6    13.13 )-----------( 444      ( 20 Dec 2019 08:57 )             31.7     12-20    134<L>  |  96<L>  |  12.0  ----------------------------<  92  4.4   |  24.0  |  0.81    Ca    9.8      20 Dec 2019 08:57  Phos  4.1     12-20  Mg     2.1     12-20    TPro  7.1  /  Alb  3.1<L>  /  TBili  0.3<L>  /  DBili  0.1  /  AST  15  /  ALT  16  /  AlkPhos  103  12-21           MICROBIOLOGY:  Culture - Acid Fast - Body Fluid w/Smear (12.20.19 @ 16:31)    Specimen Source: .Body Fluid    Acid Fast Bacilli Smear:   No acid fast bacilli seen by fluorochrome stain    Culture - Body Fluid with Gram Stain (12.19.19 @ 16:01)    Gram Stain:   Few White blood cells  No organisms seen    Specimen Source: .Body Fluid    Culture Results:   No growth at 1 day.  Culture in progress

## 2019-12-21 NOTE — PROGRESS NOTE ADULT - ASSESSMENT
-Pleural effusion; s/p drainage; cultures negative; exudative with high LDH; pigtail in place; cytology pending  -Liver abscess  -Lung nodule; decreasing in size  -SOB  -ROSELINE; on cpap 10 at home    RECC:  Chest tube per thoracic surgery. F/U path from fluid. Abx per ID. GI f/u. Will need repeat CT as outpt. CPAP at 10 cmH2O.

## 2019-12-21 NOTE — PROGRESS NOTE ADULT - ASSESSMENT
Patient is a 64 year old with PMH of Depression, HLD who was [place] [facility] [place] since 12/4/19 and transferred to [facility] for w/u and treatment of possible Cholangiocarcinoma.    Assessment/Plan:    1) Liver abscesses Status post IR Driains on 12/6 at [facility]  Fluid c/s from [facility] 12/6/19 Gram neg rods; and blood c/s from 12/4/19 negative from [facility]  Continue Zosyn; f/u end date with ID    MRCP reviewed- no evidence of CBD dilation/stone or cholangiocarcinoma  Tumor markers are negative    2) Lung Nodules and Right Pleural Effusion- Exudative effusion  Follow up repeat Chest xray  Chest tube in place    4) Anemia  - Likely chronic   - iron studies reviewed; ferritin > 1800    5) HLD  - Continue Simvastatin    6) Depression  - Continue Venlafaxin     7) HLD  -continue statin    8) GERD  -continue PPI    9) Anxiety/Depression  -Continue Xanax and Effexor     DVT Prophylaxis - Lovenox SC

## 2019-12-21 NOTE — PROGRESS NOTE ADULT - SUBJECTIVE AND OBJECTIVE BOX
CC: Follow up    INTERVAL HPI/OVERNIGHT EVENTS: Patient seen and examined, denies sob, chest pain or palpitations. Chest tube in place. 2 hepatic drains in place. Afebrile.       Vital Signs Last 24 Hrs  T(C): 36.3 (21 Dec 2019 07:53), Max: 36.4 (21 Dec 2019 02:41)  T(F): 97.3 (21 Dec 2019 07:53), Max: 97.5 (21 Dec 2019 02:41)  HR: 86 (21 Dec 2019 07:53) (80 - 86)  BP: 144/70 (21 Dec 2019 07:53) (135/74 - 144/70)  BP(mean): --  RR: 17 (21 Dec 2019 07:53) (17 - 18)  SpO2: 95% (21 Dec 2019 07:53) (95% - 98%)    PHYSICAL EXAM:    GENERAL: NAD, AOX3  HEAD:  Atraumatic, Normocephalic  EYES: EOMI, PERRLA, conjunctiva and sclera clear  ENMT: Moist mucous membranes  CHEST/LUNG: Decreased breath sounds LLL  + CT tube in place  HEART: Regular rate and rhythm; No murmurs, rubs, or gallops  ABDOMEN: Soft, Nontender, Nondistended; Bowel sounds present  2+ hepatic drains   EXTREMITIES:  2+ Peripheral Pulses, No clubbing, cyanosis, or edema        MEDICATIONS  (STANDING):  enoxaparin Injectable 40 milliGRAM(s) SubCutaneous daily  influenza   Vaccine 0.5 milliLiter(s) IntraMuscular once  lactobacillus acidophilus 1 Tablet(s) Oral two times a day  pantoprazole    Tablet 40 milliGRAM(s) Oral before breakfast  piperacillin/tazobactam IVPB.. 3.375 Gram(s) IV Intermittent every 8 hours  simvastatin 20 milliGRAM(s) Oral at bedtime  venlafaxine XR. 150 milliGRAM(s) Oral daily    MEDICATIONS  (PRN):  acetaminophen   Tablet .. 650 milliGRAM(s) Oral every 6 hours PRN Temp greater or equal to 38C (100.4F), Mild Pain (1 - 3), Moderate Pain (4 - 6)  ALPRAZolam 0.25 milliGRAM(s) Oral three times a day PRN anxiety  melatonin 3 milliGRAM(s) Oral at bedtime PRN Insomnia  ondansetron Injectable 4 milliGRAM(s) IV Push every 6 hours PRN Nausea and/or Vomiting  oxyCODONE    IR 5 milliGRAM(s) Oral every 4 hours PRN Severe Pain (7 - 10)      Allergies    No Known Allergies    Intolerances          LABS:                          9.6    13.13 )-----------( 444      ( 20 Dec 2019 08:57 )             31.7     12-20    134<L>  |  96<L>  |  12.0  ----------------------------<  92  4.4   |  24.0  |  0.81    Ca    9.8      20 Dec 2019 08:57  Phos  4.1     12-20  Mg     2.1     12-20    TPro  7.1  /  Alb  3.1<L>  /  TBili  0.3<L>  /  DBili  0.1  /  AST  15  /  ALT  16  /  AlkPhos  103  12-21          RADIOLOGY & ADDITIONAL TESTS:

## 2019-12-21 NOTE — PROGRESS NOTE ADULT - SUBJECTIVE AND OBJECTIVE BOX
Chief Complaint: This is a 64y old man patient being seen in follow-up consultation for liver abscesses.    HPI / 24H events:  Patient has no complaints.  Denies any abdominal pain, nausea or vomiting.    ROS: A 14-point review of systems was reviewed and was otherwise negative save what was reported in the HPI.    PAST MEDICAL/SURGICAL HISTORY:  Cholesterol depletion  Depression    MEDICATIONS  (STANDING):  enoxaparin Injectable 40 milliGRAM(s) SubCutaneous daily  influenza   Vaccine 0.5 milliLiter(s) IntraMuscular once  lactobacillus acidophilus 1 Tablet(s) Oral two times a day  pantoprazole    Tablet 40 milliGRAM(s) Oral before breakfast  piperacillin/tazobactam IVPB.. 3.375 Gram(s) IV Intermittent every 8 hours  simvastatin 20 milliGRAM(s) Oral at bedtime  venlafaxine XR. 150 milliGRAM(s) Oral daily    MEDICATIONS  (PRN):  acetaminophen   Tablet .. 650 milliGRAM(s) Oral every 6 hours PRN Temp greater or equal to 38C (100.4F), Mild Pain (1 - 3), Moderate Pain (4 - 6)  ALPRAZolam 0.25 milliGRAM(s) Oral three times a day PRN anxiety  diphenhydrAMINE 25 milliGRAM(s) Oral every 6 hours PRN Rash and/or Itching  melatonin 3 milliGRAM(s) Oral at bedtime PRN Insomnia  ondansetron Injectable 4 milliGRAM(s) IV Push every 6 hours PRN Nausea and/or Vomiting  oxyCODONE    IR 5 milliGRAM(s) Oral every 4 hours PRN Severe Pain (7 - 10)    No Known Allergies    T(C): 36.6 (12-21-19 @ 14:59), Max: 36.6 (12-21-19 @ 14:59)  HR: 91 (12-21-19 @ 14:59) (80 - 91)  BP: 115/77 (12-21-19 @ 14:59) (115/77 - 144/70)  RR: 19 (12-21-19 @ 14:59) (17 - 19)  SpO2: 98% (12-21-19 @ 14:59) (95% - 98%)    I&O's Summary    20 Dec 2019 07:01  -  21 Dec 2019 07:00  --------------------------------------------------------  IN: 0 mL / OUT: 250 mL / NET: -250 mL      PHYSICAL EXAM:  Constitutional: Well-developed, well-nourished, in no apparent distress  Eyes: Sclerae anicteric, conjunctivae normal  ENMT: Mucus membranes moist, no oropharyngeal thrush noted  Neck: No thyroid nodules appreciated, no significant cervical or supraclavicular lymphadenopathy  Respiratory: Breathing nonlabored; chest tube in place  Cardiovascular: Regular rate and rhythm  Gastrointestinal: Soft, nontender, nondistended, normoactive bowel sounds; no hepatosplenomegaly appreciated; no rebound tenderness or involuntary guarding  Extremities: No clubbing, cyanosis or edema  Neurological: Alert and oriented to person, place and time; no asterixis  Skin: No jaundice  Lymph Nodes: No significant lymphadenopathy  Musculoskeletal: No significant peripheral atrophy  Psychiatric: Affect and mood appropriate                   9.6    13.13 )-----------( 444      ( 12-20 @ 08:57 )             31.7                10.1   16.77 )-----------( 517      ( 12-19 @ 10:10 )             33.2       12-20    134<L>  |  96<L>  |  12.0  ----------------------------<  92  4.4   |  24.0  |  0.81    Ca    9.8      20 Dec 2019 08:57  Phos  4.1     12-20  Mg     2.1     12-20    TPro  7.1  /  Alb  3.1<L>  /  TBili  0.3<L>  /  DBili  0.1  /  AST  15  /  ALT  16  /  AlkPhos  103  12-21    LIVER FUNCTIONS - ( 21 Dec 2019 08:17 )  Alb: 3.1 g/dL / Pro: 7.1 g/dL / ALK PHOS: 103 U/L / ALT: 16 U/L / AST: 15 U/L / GGT: x           Culture - Acid Fast - Body Fluid w/Smear (collected 20 Dec 2019 16:31)  Source: .Body Fluid    Culture - Body Fluid with Gram Stain (collected 19 Dec 2019 16:01)  Source: .Body Fluid  Gram Stain (19 Dec 2019 18:05):    Few White blood cells    No organisms seen  Preliminary Report (20 Dec 2019 13:20):    No growth at 1 day.    Culture in progress      IMAGING: I personally reviewed the MRCP, and I agree with the radiologist's interpretation as described below:    < from: MR Abdomen w/ IV Cont (12.20.19 @ 11:52) >  PROCEDURE:   MRI of the abdomen was performed with and without intravenous contrast.  IV Contrast: Gadavist. 8 cc administered, 2 cc discarded.  MRCP was performed.    FINDINGS:    LOWER CHEST: Partially imaged at least moderate size right pleural effusion with compressive atelectasis of the right lower lobe. Small left pleural effusion.    LIVER: 2 drainage catheters again seen in the liver with the tip of one catheter in the region of a 3.2 x 1.2 cm collection (series 905 image 35) and the second catheter tip in the region of a collection more inferiorly measuring 2.8 x 0.9 cm (image 51). The collections have not significantly changed in size since 12/17/2019 when accounted for differences in technique. There is additional high T2 signal extending medially from the more inferior collection, likely inflammatory. Additional branching tracts of low-attenuation are seen in the right hepatic lobe which are seen on the precontrast and early postcontrast sequences, however not seen on the more delayed postcontrast sequences.  BILE DUCTS: Normal caliber. Common bile duct measures 6 mm. No common bile duct stone.  GALLBLADDER: Distended with gallstones. Normal caliber wall.  SPLEEN: Enlarged measuring 15.9 cm anteroposterior.  PANCREAS: 9 mm lipoma in the pancreatic body, otherwise unremarkable.  ADRENALS: Within normal limits.  KIDNEYS/URETERS: Kidneys normal in size. No hydronephrosis. Bilateral renal cysts.    VISUALIZED PORTIONS:    BOWEL: Within normal limits.   PERITONEUM: No ascites.  VESSELS: Abdominal aorta normal in caliber. IVC and hepatic veins are patent. Portal, splenic, and superior mesenteric veins are patent.  RETROPERITONEUM/LYMPH NODES: No lymphadenopathy.    ABDOMINAL WALL: Unremarkable.  BONES: No aggressive osseous lesion.    IMPRESSION:     Cholelithiasis.    Common bile duct normal in caliber with no common bile duct stone.    2 drainage catheters in the liver which terminate in fluid collections measuring 3.2 cm and 2.8 cm, not significantly changed in size since 12/17/2019.    Branching areas of low T1 signal in the right hepatic lobe on the precontrast and early postcontrast sequences, possibly scarring.    Partially imaged at least moderate-sized right pleural effusion with compressive atelectasis of the right lower lobe.      < end of copied text >

## 2019-12-21 NOTE — PROGRESS NOTE ADULT - SUBJECTIVE AND OBJECTIVE BOX
[facility]  INFECTIOUS DISEASES AND INTERNAL MEDICINE at [place]  =======================================================  David Dumont MD  Diplomates American Board of Internal Medicine and Infectious Diseases  Tel: [phone]      Fax: [phone]  =======================================================    [place] [id number]    Follow up: Hepatic abscess    No fever or chills  no complaints      Allergies:  No Known Allergies      Antibiotics:  piperacillin/tazobactam IVPB.. 3.375 Gram(s) IV Intermittent every 8 hours      REVIEW OF SYSTEMS:  CONSTITUTIONAL:  No Fever or chills  HEENT:  No diplopia or blurred vision.  No earache, sore throat or runny nose.  CARDIOVASCULAR:  No pressure, squeezing, strangling, tightness, heaviness or aching about the chest, neck, axilla or epigastrium.  RESPIRATORY:  No cough, shortness of breath  GASTROINTESTINAL:  No nausea, vomiting or diarrhea.  GENITOURINARY:  No dysuria, frequency or urgency.   MUSCULOSKELETAL:  no joint aches, no muscle pain  SKIN:  No change in skin, hair or nails.  NEUROLOGIC:  No Headaches, seizures or weakness.  PSYCHIATRIC:  No disorder of thought or mood.  ENDOCRINE:  No heat or cold intolerance  HEMATOLOGICAL:  No easy bruising or bleeding.       Physical Exam:  Vital Signs Last 24 Hrs  T(C): 36.4 (21 Dec 2019 02:41), Max: 36.8 (20 Dec 2019 07:46)  T(F): 97.5 (21 Dec 2019 02:41), Max: 98.2 (20 Dec 2019 07:46)  HR: 80 (21 Dec 2019 02:41) (75 - 92)  BP: 135/74 (21 Dec 2019 02:41) (109/55 - 135/74)  RR: 18 (21 Dec 2019 02:41) (18 - 20)  SpO2: 97% (21 Dec 2019 04:37) (91% - 98%)      GEN: NAD, pleasant  HEENT: normocephalic and atraumatic. EOMI. PERRL.  Anicteric  NECK: Supple.   LUNGS: Clear to auscultation.  HEART: Regular rate and rhythm .  ABDOMEN: Soft, nontender, and nondistended.  Positive bowel sounds.  + Drain   : No CVA tenderness  EXTREMITIES: Without any edema.  MSK: No joint swelling  NEUROLOGIC: No Focal Deficits  PSYCHIATRIC: Appropriate affect .  SKIN: No Rash      Labs:  12-20    134<L>  |  96<L>  |  12.0  ----------------------------<  92  4.4   |  24.0  |  0.81    Ca    9.8      20 Dec 2019 08:57  Phos  4.1     12-20  Mg     2.1     12-20    TPro  7.7  /  Alb  x   /  TBili  x   /  DBili  x   /  AST  x   /  ALT  x   /  AlkPhos  x   12-20                          9.6    13.13 )-----------( 444      ( 20 Dec 2019 08:57 )             31.7       LIVER FUNCTIONS - ( 20 Dec 2019 08:57 )  Alb: x     / Pro: 7.7 g/dL / ALK PHOS: x     / ALT: x     / AST: x     / GGT: x               RECENT CULTURES:  12-19 @ 16:01 .Body Fluid     No growth at 1 day.  Culture in progress  Few White blood cells  No organisms seen        OUTSIDE CULTURES:  12/6/19 Body fluid culture from [facility]  Gram negative rods (Anaerobic) - Limited growth and not able to be identify     Blood cultures at [facility] negative        < from: CT Abdomen and Pelvis w/ Oral Cont and w/ IV Cont (12.17.19 @ 20:52) >  EXAM:  CT ABDOMEN AND PELVIS OC IC                         EXAM:  CT CHEST IC                          PROCEDURE DATE:  12/17/2019      INTERPRETATION:  FINAL REPORT:    Addendum created by Lake Bull MD on 12/17/2019 9:34:43 PM EST     Addendum to the chest impression    9.2 mm the spiculated nodule in the medial aspect of the left upper lobe.   Series 4, image 11. For both low risk and high risk patients, consider CT   at 3   months, PET/CT or biopsy. (Lakshmi et al., Fleischner Society, 2017)  Initial report created on 12/17/2019 9:33:41 PM EST     PROCEDURE INFORMATION:   Exam: CT Chest With Contrast   Exam date and time: 12/17/2019 8:34 PM   Age: 64 years old   Clinical indication: Lung lesions.    TECHNIQUE:   Imaging protocol: Computed tomography of the chest with intravenous   contrast.   3D rendering: MIP and/or 3D reconstructed images were created by the   technologist.   Contrast material: OMNI 300; Contrast volume: 94 ml; Contrast route: IV;      COMPARISON:   No relevant prior studies available.     FINDINGS:   Lungs: Compressive atelectasis of the entire right lower lobe and   inferior aspect of the left lower lobe. Compressive atelectasis at the   posterior aspect of the right middle lobe 1.1 x 0.9 cm left upper lobe   pulmonary nodule, previously 1.9 x 1.8 cm on 12/4/2019. Previously seen   right-sided nodules not seen on the current study, however could be   secondary to the atelectasis in the current study.  Pleural space: Moderate partially loculated right pleural effusion and   small left pleural effusion.  Heart: Normal size. No pericardial effusion.  Vessels: Thoracic aorta normal in caliber. Coronary artery calcifications.  Mediastinal/hilar: No enlarged lymph nodes.  Chest wall/lower neck: No enlarged axillary lymph nodes.  Bones: Partially imaged fat density lesion in the right humeral head   measuring 1.5 cm, likely an intraosseous lipoma.    IMPRESSION:     Moderate partially loculated right pleural effusion with complete   atelectasis of the right lower lobe.    Small left pleural effusion with atelectasis at the inferior aspect of   the left lower lobe.    1.1 cm left upper lobe pulmonary nodule, decreased in size since   12/4/2019, previously 1.9 cm    Previously seen right-sided pulmonary nodules not seen on the current   study which may be secondary to compressive atelectasis in the right   lower and middle lobes; the pulmonary nodules were likely infectious in   etiology.      =========================  PROCEDURE INFORMATION:   Exam: CT Abdomen With Contrast   Exam date and time: 12/17/2019 8:34 PM   Age: 64 years old   Clinical indication: Abdominal pain and other: Liver abscess; Acute;   Chest   pain; Other: Lung lesions     TECHNIQUE:   Imaging protocol: Computedtomography images of the abdomen with   intravenous   contrast.   3D rendering: MIP and/or 3D reconstructed images were created by the   technologist.   Contrast material: OMNI 300; Contrast volume: 94 ml; Contrast route: IV;      COMPARISON:   CT abdomen/pelvis 12/4/2019 and MRI abdomen 12/10/2019.    FINDINGS:   Liver: 2 drainage catheters in the liver with the more superior catheter   in a collection in the right hepatic lobe measuring 3.3 x 2.1 cm (series   3 image 1:30) and the more inferiorcatheter in a collection measuring   3.2 x 1.4 cm (series 3 image 146). There are additional linear foci of   low attenuation in the right hepatic lobe (series 3 image 140), either   additional areas of fluid or dilated biliary radicles. There additional.  Gallbladder and bile ducts: Cholelithiasis. Also see above.  Pancreas: Normal. No ductal dilation.   Spleen: Enlarged measuring 16.7 cm anteroposterior.  Adrenals: Left adrenal gland thickening. Unremarkable right adrenal gland.  Kidneys and ureters: No hydronephrosis. Bilateral nonobstructing renal   calculi and bilateral renal cysts.    Stomach and bowel: Moderate amount retained fecal material in the colon.   No bowel obstruction. Colonic diverticulosis without evidence of   diverticulitis. Appendix is normal. There is a duodenal diverticulum. Fat   density focus at the anterior aspect of the proximal sigmoid colon   (series 3 image 248) with a high attenuation rim, likely an epiploic   appendage or fat necrosis.    Intraperitoneal space: No ascites or free air  Lymph nodes: No enlarged lymph nodes.  Vessels: Abdominal aorta normal in caliber with mild calcified plaque. No   definite thrombus seen in the IVC.  Bones/joints: No aggressive osseous lesion.  Abdominal wall: Small fat-containing umbilical hernia. Small droplet of   air in the right anterior abdominal wall which may be related to the   placement of the drainage catheters or a subcutaneous injection.    IMPRESSION:     2 drainage catheters in the liver which terminate and fluid collections   measuring 3.3 x 2.1 cm and 3.2 x 1.4 cm.    Additional linear foci of low attenuation in the right hepatic lobe,   either additional areas of fluid or dilated intrahepatic biliary radicles    < end of copied text >

## 2019-12-21 NOTE — PROGRESS NOTE ADULT - ASSESSMENT
63y/o  Male  who was transferred from [facility] to [facility]. Patient was initially admitted to [facility] for fever, chills, leukocytosis, was thought to have pneumonia and was also found to have a liver lesion. He had blood cultures which were negative. IR drain was placed which showed liver abscess. Cultures from the abscess with GNR anaerobic. He was maintained on Vancomycin and Zosyn at [facility]. Here patient has been afebrile. Had repeat imaging. Seen by GI. Maintained on Zosyn.      Hepatic abscess  s/p IR drainage at [facility]  Exudative Pleural Effusion   s/p Chest Tube 12/19/19  Hepatic abscess Culture with Gram negative rods (Anaerobic) - Limited growth and not able to be identify       - Pleural fluid culture pending  - Pleural fluid is exudative   - [facility] blood cultures negative  - Hepatic abscess cultures Gram negative rods (Anaerobic) - Limited growth and not able to be identify   - CT A/P with hepatic abscess with drain  - Continue Zosyn  - Thoracic surgery consult noted   - GI consult noted  - Follow up all cultures  - Trend Fever  - Trend Leukocytosis      Will Follow

## 2019-12-21 NOTE — PROGRESS NOTE ADULT - ASSESSMENT
Hepatic abscess: MRCP does not demonstrate malignancy and tumor markers are normal. Plan on repeat outpatient imaging in 6-8 weeks to confirm resolution of these lesion. on abx per ID  Leukocytosis, anemia and thrombocytosis: reactive to underlying infection. no heme intervention required at this time  DVT prophylaxis: lovenox

## 2019-12-22 LAB
ALBUMIN SERPL ELPH-MCNC: 3 G/DL — LOW (ref 3.3–5.2)
ALP SERPL-CCNC: 97 U/L — SIGNIFICANT CHANGE UP (ref 40–120)
ALT FLD-CCNC: 13 U/L — SIGNIFICANT CHANGE UP
ANION GAP SERPL CALC-SCNC: 11 MMOL/L — SIGNIFICANT CHANGE UP (ref 5–17)
AST SERPL-CCNC: 14 U/L — SIGNIFICANT CHANGE UP
BILIRUB SERPL-MCNC: 0.2 MG/DL — LOW (ref 0.4–2)
BUN SERPL-MCNC: 17 MG/DL — SIGNIFICANT CHANGE UP (ref 8–20)
CALCIUM SERPL-MCNC: 9.6 MG/DL — SIGNIFICANT CHANGE UP (ref 8.6–10.2)
CHLORIDE SERPL-SCNC: 102 MMOL/L — SIGNIFICANT CHANGE UP (ref 98–107)
CO2 SERPL-SCNC: 25 MMOL/L — SIGNIFICANT CHANGE UP (ref 22–29)
CREAT SERPL-MCNC: 0.91 MG/DL — SIGNIFICANT CHANGE UP (ref 0.5–1.3)
GLUCOSE SERPL-MCNC: 91 MG/DL — SIGNIFICANT CHANGE UP (ref 70–115)
HCT VFR BLD CALC: 29.4 % — LOW (ref 39–50)
HGB BLD-MCNC: 8.9 G/DL — LOW (ref 13–17)
MCHC RBC-ENTMCNC: 27.1 PG — SIGNIFICANT CHANGE UP (ref 27–34)
MCHC RBC-ENTMCNC: 30.3 GM/DL — LOW (ref 32–36)
MCV RBC AUTO: 89.4 FL — SIGNIFICANT CHANGE UP (ref 80–100)
PLATELET # BLD AUTO: 356 K/UL — SIGNIFICANT CHANGE UP (ref 150–400)
POTASSIUM SERPL-MCNC: 4.3 MMOL/L — SIGNIFICANT CHANGE UP (ref 3.5–5.3)
POTASSIUM SERPL-SCNC: 4.3 MMOL/L — SIGNIFICANT CHANGE UP (ref 3.5–5.3)
PROT SERPL-MCNC: 7 G/DL — SIGNIFICANT CHANGE UP (ref 6.6–8.7)
RBC # BLD: 3.29 M/UL — LOW (ref 4.2–5.8)
RBC # FLD: 17.6 % — HIGH (ref 10.3–14.5)
SODIUM SERPL-SCNC: 138 MMOL/L — SIGNIFICANT CHANGE UP (ref 135–145)
WBC # BLD: 8.92 K/UL — SIGNIFICANT CHANGE UP (ref 3.8–10.5)
WBC # FLD AUTO: 8.92 K/UL — SIGNIFICANT CHANGE UP (ref 3.8–10.5)

## 2019-12-22 PROCEDURE — 99232 SBSQ HOSP IP/OBS MODERATE 35: CPT

## 2019-12-22 PROCEDURE — 71045 X-RAY EXAM CHEST 1 VIEW: CPT | Mod: 26,76

## 2019-12-22 PROCEDURE — 99233 SBSQ HOSP IP/OBS HIGH 50: CPT

## 2019-12-22 RX ADMIN — Medication 1 TABLET(S): at 18:33

## 2019-12-22 RX ADMIN — OXYCODONE HYDROCHLORIDE 5 MILLIGRAM(S): 5 TABLET ORAL at 11:42

## 2019-12-22 RX ADMIN — OXYCODONE HYDROCHLORIDE 5 MILLIGRAM(S): 5 TABLET ORAL at 12:29

## 2019-12-22 RX ADMIN — Medication 1 TABLET(S): at 06:03

## 2019-12-22 RX ADMIN — PANTOPRAZOLE SODIUM 40 MILLIGRAM(S): 20 TABLET, DELAYED RELEASE ORAL at 06:02

## 2019-12-22 RX ADMIN — Medication 3 MILLIGRAM(S): at 21:37

## 2019-12-22 RX ADMIN — PIPERACILLIN AND TAZOBACTAM 25 GRAM(S): 4; .5 INJECTION, POWDER, LYOPHILIZED, FOR SOLUTION INTRAVENOUS at 06:02

## 2019-12-22 RX ADMIN — PIPERACILLIN AND TAZOBACTAM 25 GRAM(S): 4; .5 INJECTION, POWDER, LYOPHILIZED, FOR SOLUTION INTRAVENOUS at 14:25

## 2019-12-22 RX ADMIN — Medication 150 MILLIGRAM(S): at 11:43

## 2019-12-22 RX ADMIN — OXYCODONE HYDROCHLORIDE 5 MILLIGRAM(S): 5 TABLET ORAL at 00:39

## 2019-12-22 RX ADMIN — OXYCODONE HYDROCHLORIDE 5 MILLIGRAM(S): 5 TABLET ORAL at 18:33

## 2019-12-22 RX ADMIN — SIMVASTATIN 20 MILLIGRAM(S): 20 TABLET, FILM COATED ORAL at 21:37

## 2019-12-22 RX ADMIN — OXYCODONE HYDROCHLORIDE 5 MILLIGRAM(S): 5 TABLET ORAL at 01:10

## 2019-12-22 RX ADMIN — Medication 0.25 MILLIGRAM(S): at 14:25

## 2019-12-22 RX ADMIN — Medication 0.25 MILLIGRAM(S): at 21:37

## 2019-12-22 RX ADMIN — Medication 25 MILLIGRAM(S): at 00:35

## 2019-12-22 RX ADMIN — PIPERACILLIN AND TAZOBACTAM 25 GRAM(S): 4; .5 INJECTION, POWDER, LYOPHILIZED, FOR SOLUTION INTRAVENOUS at 21:37

## 2019-12-22 RX ADMIN — ENOXAPARIN SODIUM 40 MILLIGRAM(S): 100 INJECTION SUBCUTANEOUS at 11:43

## 2019-12-22 NOTE — PROGRESS NOTE ADULT - ASSESSMENT
Hepatic abscess: MRCP does not demonstrate malignancy, CBD dilation and tumor markers are normal. Plan on repeat outpatient imaging in 6-8 weeks to confirm resolution of these lesion. on abx per ID    Leukocytosis, anemia and thrombocytosis: reactive to underlying infection. no heme intervention required at this time. WBC imporving    DVT prophylaxis: lovenox

## 2019-12-22 NOTE — DIETITIAN INITIAL EVALUATION ADULT. - ADD RECOMMEND
RX: Add Ensure Enlive TID to optimize po intake and provide an additional 350kcal, 20g protein per serving, encourage po intake and HBV protein, monitor weights and labs

## 2019-12-22 NOTE — PROGRESS NOTE ADULT - ASSESSMENT
Patient is a 64 year old with PMH of Depression, HLD who was [place] [facility] [place] since 12/4/19 and transferred to [facility] for w/u and treatment of possible Cholangiocarcinoma.    Assessment/Plan:    1. Liver abscesses Status post IR Drains on 12/6 at [facility]  Fluid c/s from [facility] 12/6/19 Gram neg rods; and blood c/s from 12/4/19 negative from [facility]  Continue Zosyn; f/u end date with ID    MRCP reviewed- no evidence of CBD dilation/stone or cholangiocarcinoma  Tumor markers are negative. Repeat imaging in 6-8 weeks     2. Lung Nodules and Right Pleural Effusion- Exudative effusion  Follow up repeat Chest xray  Chest tube in place    3. Hyperlipidemia on statin therapy    4. Depression on velafaxine    5.  Anxiety/Depression Continue Xanax and Effexor     DVT Prophylaxis - Lovenox SC

## 2019-12-22 NOTE — DIETITIAN INITIAL EVALUATION ADULT. - PERTINENT LABORATORY DATA
12-22 Na138 mmol/L Glu 91 mg/dL K+ 4.3 mmol/L Cr  0.91 mg/dL BUN 17.0 mg/dL Phos n/a   Alb 3.0 g/dL<L> PAB n/a

## 2019-12-22 NOTE — PROGRESS NOTE ADULT - SUBJECTIVE AND OBJECTIVE BOX
Chief Complaint: This is a 64y old man patient being seen in follow-up consultation for liver abscesses.    HPI / 24H events:  Patient has no complaints.  Denies any abdominal pain, nausea or vomiting.    ROS: A 14-point review of systems was reviewed and was otherwise negative save what was reported in the HPI.    PAST MEDICAL/SURGICAL HISTORY:  Cholesterol depletion  Depression    MEDICATIONS  (STANDING):  enoxaparin Injectable 40 milliGRAM(s) SubCutaneous daily  influenza   Vaccine 0.5 milliLiter(s) IntraMuscular once  lactobacillus acidophilus 1 Tablet(s) Oral two times a day  pantoprazole    Tablet 40 milliGRAM(s) Oral before breakfast  piperacillin/tazobactam IVPB.. 3.375 Gram(s) IV Intermittent every 8 hours  simvastatin 20 milliGRAM(s) Oral at bedtime  venlafaxine XR. 150 milliGRAM(s) Oral daily    MEDICATIONS  (PRN):  acetaminophen   Tablet .. 650 milliGRAM(s) Oral every 6 hours PRN Temp greater or equal to 38C (100.4F), Mild Pain (1 - 3), Moderate Pain (4 - 6)  ALPRAZolam 0.25 milliGRAM(s) Oral three times a day PRN anxiety  diphenhydrAMINE 25 milliGRAM(s) Oral every 6 hours PRN Rash and/or Itching  melatonin 3 milliGRAM(s) Oral at bedtime PRN Insomnia  ondansetron Injectable 4 milliGRAM(s) IV Push every 6 hours PRN Nausea and/or Vomiting  oxyCODONE    IR 5 milliGRAM(s) Oral every 4 hours PRN Severe Pain (7 - 10)    No Known Allergies    Vital Signs Last 24 Hrs  T(C): 36.8 (22 Dec 2019 07:44), Max: 36.8 (22 Dec 2019 07:44)  T(F): 98.3 (22 Dec 2019 07:44), Max: 98.3 (22 Dec 2019 07:44)  HR: 74 (22 Dec 2019 07:44) (74 - 91)  BP: 122/75 (22 Dec 2019 07:44) (115/77 - 122/75)  RR: 19 (22 Dec 2019 07:44) (19 - 19)  SpO2: 94% (22 Dec 2019 07:44) (94% - 98%)    PHYSICAL EXAM:  Constitutional: Well-developed, well-nourished, in no apparent distress  Eyes: Sclerae anicteric, conjunctivae normal  ENMT: Mucus membranes moist, no oropharyngeal thrush noted  Neck: No thyroid nodules appreciated, no significant cervical or supraclavicular lymphadenopathy  Respiratory: Breathing nonlabored; chest tube in place  Cardiovascular: Regular rate and rhythm  Gastrointestinal: Soft, nontender, nondistended, normoactive bowel sounds; no hepatosplenomegaly appreciated; no rebound tenderness or involuntary guarding; two drains in place - one draining bilious fluid, the other sanguinous   Extremities: No clubbing, cyanosis or edema  Neurological: Alert and oriented to person, place and time; no asterixis  Skin: No jaundice  Lymph Nodes: No significant lymphadenopathy  Musculoskeletal: No significant peripheral atrophy  Psychiatric: Affect and mood appropriate               8.9    8.92  )-----------( 356      ( 12-22 @ 06:44 )             29.4                9.6    13.13 )-----------( 444      ( 12-20 @ 08:57 )             31.7     12-22    138  |  102  |  17.0  ----------------------------<  91  4.3   |  25.0  |  0.91    Ca    9.6      22 Dec 2019 06:44    TPro  7.0  /  Alb  3.0<L>  /  TBili  0.2<L>  /  DBili  x   /  AST  14  /  ALT  13  /  AlkPhos  97  12-22      Culture - Acid Fast - Body Fluid w/Smear (collected 20 Dec 2019 16:31)  Source: .Body Fluid    Culture - Body Fluid with Gram Stain (collected 19 Dec 2019 16:01)  Source: .Body Fluid  Gram Stain (19 Dec 2019 18:05):    Few White blood cells    No organisms seen  Preliminary Report (22 Dec 2019 10:58):    No growth at 3 days.    Culture in progress      IMAGING: I personally reviewed the MRCP, and I agree with the radiologist's interpretation as described below:    < from: MR Abdomen w/ IV Cont (12.20.19 @ 11:52) >  PROCEDURE:   MRI of the abdomen was performed with and without intravenous contrast.  IV Contrast: Gadavist. 8 cc administered, 2 cc discarded.  MRCP was performed.    FINDINGS:    LOWER CHEST: Partially imaged at least moderate size right pleural effusion with compressive atelectasis of the right lower lobe. Small left pleural effusion.    LIVER: 2 drainage catheters again seen in the liver with the tip of one catheter in the region of a 3.2 x 1.2 cm collection (series 905 image 35) and the second catheter tip in the region of a collection more inferiorly measuring 2.8 x 0.9 cm (image 51). The collections have not significantly changed in size since 12/17/2019 when accounted for differences in technique. There is additional high T2 signal extending medially from the more inferior collection, likely inflammatory. Additional branching tracts of low-attenuation are seen in the right hepatic lobe which are seen on the precontrast and early postcontrast sequences, however not seen on the more delayed postcontrast sequences.  BILE DUCTS: Normal caliber. Common bile duct measures 6 mm. No common bile duct stone.  GALLBLADDER: Distended with gallstones. Normal caliber wall.  SPLEEN: Enlarged measuring 15.9 cm anteroposterior.  PANCREAS: 9 mm lipoma in the pancreatic body, otherwise unremarkable.  ADRENALS: Within normal limits.  KIDNEYS/URETERS: Kidneys normal in size. No hydronephrosis. Bilateral renal cysts.    VISUALIZED PORTIONS:    BOWEL: Within normal limits.   PERITONEUM: No ascites.  VESSELS: Abdominal aorta normal in caliber. IVC and hepatic veins are patent. Portal, splenic, and superior mesenteric veins are patent.  RETROPERITONEUM/LYMPH NODES: No lymphadenopathy.    ABDOMINAL WALL: Unremarkable.  BONES: No aggressive osseous lesion.    IMPRESSION:     Cholelithiasis.    Common bile duct normal in caliber with no common bile duct stone.    2 drainage catheters in the liver which terminate in fluid collections measuring 3.2 cm and 2.8 cm, not significantly changed in size since 12/17/2019.    Branching areas of low T1 signal in the right hepatic lobe on the precontrast and early postcontrast sequences, possibly scarring.    Partially imaged at least moderate-sized right pleural effusion with compressive atelectasis of the right lower lobe.      < end of copied text >

## 2019-12-22 NOTE — CHART NOTE - NSCHARTNOTEFT_GEN_A_CORE
Thoracic Surgery PA    Right chest tube removed today due to minimal output. Follow up Chest Xray stable. Further care and plan as per primary team. Thoracic Surgery to sign off for now. Please reconsult as needed. Thank you.    Jia Bullock RPA-C  Thoracic Surgery  [phone]

## 2019-12-22 NOTE — DIETITIAN INITIAL EVALUATION ADULT. - MALNUTRITION
NFPE performed. Severe muscle wasting at temples. Severe fat loss in orbital region, buccal pads. Severe (chronic)

## 2019-12-22 NOTE — DIETITIAN INITIAL EVALUATION ADULT. - OTHER INFO
Patient is a 64 year old with PMH of Depression, HLD who was admitted to [facility] for liver abscess since 12/4/19 and transferred to [facility] for w/u and treatment of possible Cholangiocarcinoma. Pt reported improving po intake. Pt reported intentionally trying to lose weight by consuming a healthier diet but had rapid weight loss that was not intentional. Pt reported poor appetite causing the unintentional weight loss. Pt provided with verbal heart healthy nutrition education and was encouraged to consume HBV protein. Pt receptive to recommendations and verbalized understanding. Height not available.

## 2019-12-22 NOTE — PROGRESS NOTE ADULT - ASSESSMENT
65y/o  Male  who was transferred from [facility] to [facility]. Patient was initially admitted to [facility] for fever, chills, leukocytosis, was thought to have pneumonia and was also found to have a liver lesion. He had blood cultures which were negative. IR drain was placed which showed liver abscess. Cultures from the abscess with GNR anaerobic. He was maintained on Vancomycin and Zosyn at [facility]. Here patient has been afebrile. Had repeat imaging. Seen by GI. Maintained on Zosyn.      Hepatic abscess  s/p IR drainage at [facility]  Exudative Pleural Effusion   s/p Chest Tube 12/19/19  Hepatic abscess Culture with Gram negative rods (Anaerobic) - Limited growth and not able to be identify       - Pleural fluid culture negative  - Pleural fluid is exudative   - [facility] blood cultures negative  - Hepatic abscess cultures Gram negative rods (Anaerobic) - Limited growth and not able to be identify   - CT A/P with hepatic abscess with drain  - Continue Zosyn  - Thoracic surgery consult noted   - GI consult noted  - Follow up all cultures  - Trend Fever  - Trend Leukocytosis      Will Follow 5 7

## 2019-12-22 NOTE — PROGRESS NOTE ADULT - ASSESSMENT
Patient with multiple liver abscesses on pip/tazo.  Source and organism unknown.  Though he had a colonoscopy this past April, he will need eventual colonoscopy to rule out colonic neoplasm.  This can be done by his primary gastroenterologist as an outpatient.  Supportive care per primary team.  Antibiosis per ID.  Will follow.    Thank you for the consult.  Please do not hesitate to call with any questions or concerns.    ZURDO Hickey MD  [facility]  Division of Gastroenterology  Tel [phone]  Fax [phone]

## 2019-12-22 NOTE — DIETITIAN INITIAL EVALUATION ADULT. - SIGNS/SYMPTOMS
As evidenced by pt meeting <75% est energy needs x1mo, severe muscle/fat loss, 14% wt loss x2 months

## 2019-12-22 NOTE — PROGRESS NOTE ADULT - SUBJECTIVE AND OBJECTIVE BOX
64y Male s/p right pigtail for pleural effusion    Subjective: feel OK, discussed removing the pleural tube today and he is happy about that.  No other complaints.      T(C): 36.8 (12-22-19 @ 07:44), Max: 36.8 (12-22-19 @ 07:44)  HR: 74 (12-22-19 @ 07:44) (74 - 91)  BP: 122/75 (12-22-19 @ 07:44) (115/77 - 122/75)  ABP: --  ABP(mean): --  RR: 19 (12-22-19 @ 07:44) (19 - 19)  SpO2: 94% (12-22-19 @ 07:44) (94% - 98%)  Wt(kg): --  CVP(mm Hg): --  CO: --  CI: --  PA: --         12-22    138  |  102  |  17.0  ----------------------------<  91  4.3   |  25.0  |  0.91    Ca    9.6      22 Dec 2019 06:44    TPro  7.0  /  Alb  3.0<L>  /  TBili  0.2<L>  /  DBili  x   /  AST  14  /  ALT  13  /  AlkPhos  97  12-22                               8.9    8.92  )-----------( 356      ( 22 Dec 2019 06:44 )             29.4                     CAPILLARY BLOOD GLUCOSE               CXR:    I&O's Detail    21 Dec 2019 07:01  -  22 Dec 2019 07:00  --------------------------------------------------------  IN:  Total IN: 0 mL    OUT:    Chest Tube: 25 mL    Drain: 15 mL    Drain: 10 mL    Voided: 900 mL  Total OUT: 950 mL    Total NET: -950 mL          MEDICATIONS  (STANDING):  enoxaparin Injectable 40 milliGRAM(s) SubCutaneous daily  influenza   Vaccine 0.5 milliLiter(s) IntraMuscular once  lactobacillus acidophilus 1 Tablet(s) Oral two times a day  pantoprazole    Tablet 40 milliGRAM(s) Oral before breakfast  piperacillin/tazobactam IVPB.. 3.375 Gram(s) IV Intermittent every 8 hours  simvastatin 20 milliGRAM(s) Oral at bedtime  venlafaxine XR. 150 milliGRAM(s) Oral daily    MEDICATIONS  (PRN):  acetaminophen   Tablet .. 650 milliGRAM(s) Oral every 6 hours PRN Temp greater or equal to 38C (100.4F), Mild Pain (1 - 3), Moderate Pain (4 - 6)  ALPRAZolam 0.25 milliGRAM(s) Oral three times a day PRN anxiety  diphenhydrAMINE 25 milliGRAM(s) Oral every 6 hours PRN Rash and/or Itching  melatonin 3 milliGRAM(s) Oral at bedtime PRN Insomnia  ondansetron Injectable 4 milliGRAM(s) IV Push every 6 hours PRN Nausea and/or Vomiting  oxyCODONE    IR 5 milliGRAM(s) Oral every 4 hours PRN Severe Pain (7 - 10)    Constitutional: NAD  Neck: supple, No JVD. Trachea Midline  Respiratory: Decreased LLL  Cardiovascular: Regular rate, regular rhythm, normal S1, S2; no murmurs or rub  Gastrointestinal: Soft, non-tender, non distended, normal bowel sounds + drains   Extremities: VELASQUEZ x 4, no peripheral edema, no cyanosis, no clubbing   Tubes/Lines: Rt pleural CT-No obvious air leak noted. Incision D/I           -----------------------------------------------------------------------------------------------------------------------------------------------------------------------------  -----------------------------------------------------------------------------------------------------------------------------------------------------------------------------

## 2019-12-22 NOTE — PROGRESS NOTE ADULT - SUBJECTIVE AND OBJECTIVE BOX
CC: Follow up    INTERVAL HPI/OVERNIGHT EVENTS: Patient seen and examined, complaints of itching bilateral arms and dryness.       Vital Signs Last 24 Hrs  T(C): 36.8 (22 Dec 2019 07:44), Max: 36.8 (22 Dec 2019 07:44)  T(F): 98.3 (22 Dec 2019 07:44), Max: 98.3 (22 Dec 2019 07:44)  HR: 74 (22 Dec 2019 07:44) (74 - 91)  BP: 122/75 (22 Dec 2019 07:44) (115/77 - 122/75)  BP(mean): --  RR: 19 (22 Dec 2019 07:44) (19 - 19)  SpO2: 94% (22 Dec 2019 07:44) (94% - 98%)    PHYSICAL EXAM:    GENERAL: NAD, AOX3  HEAD:  Atraumatic, Normocephalic  EYES: EOMI, PERRLA, conjunctiva and sclera clear  ENMT: Moist mucous membranes  CHEST/LUNG: Decreased breath sounds LLL  + CT tube in place  HEART: Regular rate and rhythm; No murmurs, rubs, or gallops  ABDOMEN: Soft, Nontender, Nondistended; Bowel sounds present  2+ hepatic drains   EXTREMITIES:  2+ Peripheral Pulses, No clubbing, cyanosis, or edema          MEDICATIONS  (STANDING):  enoxaparin Injectable 40 milliGRAM(s) SubCutaneous daily  influenza   Vaccine 0.5 milliLiter(s) IntraMuscular once  lactobacillus acidophilus 1 Tablet(s) Oral two times a day  pantoprazole    Tablet 40 milliGRAM(s) Oral before breakfast  piperacillin/tazobactam IVPB.. 3.375 Gram(s) IV Intermittent every 8 hours  simvastatin 20 milliGRAM(s) Oral at bedtime  venlafaxine XR. 150 milliGRAM(s) Oral daily    MEDICATIONS  (PRN):  acetaminophen   Tablet .. 650 milliGRAM(s) Oral every 6 hours PRN Temp greater or equal to 38C (100.4F), Mild Pain (1 - 3), Moderate Pain (4 - 6)  ALPRAZolam 0.25 milliGRAM(s) Oral three times a day PRN anxiety  diphenhydrAMINE 25 milliGRAM(s) Oral every 6 hours PRN Rash and/or Itching  melatonin 3 milliGRAM(s) Oral at bedtime PRN Insomnia  ondansetron Injectable 4 milliGRAM(s) IV Push every 6 hours PRN Nausea and/or Vomiting  oxyCODONE    IR 5 milliGRAM(s) Oral every 4 hours PRN Severe Pain (7 - 10)      Allergies    No Known Allergies    Intolerances          LABS:                          8.9    8.92  )-----------( 356      ( 22 Dec 2019 06:44 )             29.4     12-22    138  |  102  |  17.0  ----------------------------<  91  4.3   |  25.0  |  0.91    Ca    9.6      22 Dec 2019 06:44    TPro  7.0  /  Alb  3.0<L>  /  TBili  0.2<L>  /  DBili  x   /  AST  14  /  ALT  13  /  AlkPhos  97  12-22          RADIOLOGY & ADDITIONAL TESTS:

## 2019-12-22 NOTE — CHART NOTE - NSCHARTNOTEFT_GEN_A_CORE
Upon Nutritional Assessment by the Registered Dietitian your patient was determined to meet criteria / has evidence of the following diagnosis/diagnoses:            [x ] Severe Protein Calorie Malnutrition        Findings as based on:  •  Comprehensive nutrition assessment and consultation  •  Calorie counts (nutrient intake analysis)  •  Food acceptance and intake status from observations by staff  •  Follow up  •  Patient education  •  Intervention secondary to interdisciplinary rounds  •   concerns      Treatment:    The following diet has been recommended:      PROVIDER Section:     By signing this assessment you are acknowledging and agree with the diagnosis/diagnoses assigned by the Registered Dietitian    Comments:    RX: Add Ensure Enlive TID to optimize po intake and provide an additional 350kcal, 20g protein per serving, encourage po intake and HBV protein, monitor weights and labs

## 2019-12-22 NOTE — PROGRESS NOTE ADULT - SUBJECTIVE AND OBJECTIVE BOX
[facility]  INFECTIOUS DISEASES AND INTERNAL MEDICINE at [place]  =======================================================  David Dumont MD  Diplomates American Board of Internal Medicine and Infectious Diseases  Tel: [phone]      Fax: [phone]  =======================================================    SHIN DEGROOT [id number]    Follow up: Hepatic abscess    No fever or chills  no complaints      Allergies:  No Known Allergies      Antibiotics:  piperacillin/tazobactam IVPB.. 3.375 Gram(s) IV Intermittent every 8 hours      REVIEW OF SYSTEMS:  CONSTITUTIONAL:  No Fever or chills  HEENT:  No diplopia or blurred vision.  No earache, sore throat or runny nose.  CARDIOVASCULAR:  No pressure, squeezing, strangling, tightness, heaviness or aching about the chest, neck, axilla or epigastrium.  RESPIRATORY:  No cough, shortness of breath  GASTROINTESTINAL:  No nausea, vomiting or diarrhea.  GENITOURINARY:  No dysuria, frequency or urgency.   MUSCULOSKELETAL:  no joint aches, no muscle pain  SKIN:  No change in skin, hair or nails.  NEUROLOGIC:  No Headaches, seizures or weakness.  PSYCHIATRIC:  No disorder of thought or mood.  ENDOCRINE:  No heat or cold intolerance  HEMATOLOGICAL:  No easy bruising or bleeding.       Physical Exam:  Vital Signs Last 24 Hrs  T(C): 36.6 (21 Dec 2019 14:59), Max: 36.6 (21 Dec 2019 14:59)  T(F): 97.8 (21 Dec 2019 14:59), Max: 97.8 (21 Dec 2019 14:59)  HR: 88 (22 Dec 2019 04:20) (86 - 91)  BP: 115/77 (21 Dec 2019 14:59) (115/77 - 144/70)  RR: 19 (21 Dec 2019 14:59) (17 - 19)  SpO2: 96% (22 Dec 2019 04:20) (95% - 98%)      GEN: NAD, pleasant  HEENT: normocephalic and atraumatic. EOMI. PERRL.  Anicteric  NECK: Supple.   LUNGS: Clear to auscultation.  HEART: Regular rate and rhythm .  ABDOMEN: Soft, nontender, and nondistended.  Positive bowel sounds.  + Drain   : No CVA tenderness  EXTREMITIES: Without any edema.  MSK: No joint swelling  NEUROLOGIC: No Focal Deficits  PSYCHIATRIC: Appropriate affect .  SKIN: No Rash      Labs:  12-20    134<L>  |  96<L>  |  12.0  ----------------------------<  92  4.4   |  24.0  |  0.81    Ca    9.8      20 Dec 2019 08:57  Phos  4.1     12-20  Mg     2.1     12-20    TPro  7.1  /  Alb  3.1<L>  /  TBili  0.3<L>  /  DBili  0.1  /  AST  15  /  ALT  16  /  AlkPhos  103  12-21                          8.9    8.92  )-----------( 356      ( 22 Dec 2019 06:44 )             29.4       LIVER FUNCTIONS - ( 21 Dec 2019 08:17 )  Alb: 3.1 g/dL / Pro: 7.1 g/dL / ALK PHOS: 103 U/L / ALT: 16 U/L / AST: 15 U/L / GGT: x               RECENT CULTURES:  12-19 @ 16:01 .Body Fluid     No growth at 1 day.  Culture in progress  Few White blood cells  No organisms seen        OUTSIDE CULTURES:  12/6/19 Body fluid culture from [facility]  Gram negative rods (Anaerobic) - Limited growth and not able to be identify     Blood cultures at [facility] negative        < from: CT Abdomen and Pelvis w/ Oral Cont and w/ IV Cont (12.17.19 @ 20:52) >  EXAM:  CT ABDOMEN AND PELVIS OC IC                         EXAM:  CT CHEST IC                          PROCEDURE DATE:  12/17/2019      INTERPRETATION:  FINAL REPORT:    Addendum created by Lake Bull MD on 12/17/2019 9:34:43 PM EST     Addendum to the chest impression    9.2 mm the spiculated nodule in the medial aspect of the left upper lobe.   Series 4, image 11. For both low risk and high risk patients, consider CT   at 3   months, PET/CT or biopsy. (Lakshmi et al., Fleischner Society, 2017)  Initial report created on 12/17/2019 9:33:41 PM EST     PROCEDURE INFORMATION:   Exam: CT Chest With Contrast   Exam date and time: 12/17/2019 8:34 PM   Age: 64 years old   Clinical indication: Lung lesions.    TECHNIQUE:   Imaging protocol: Computed tomography of the chest with intravenous   contrast.   3D rendering: MIP and/or 3D reconstructed images were created by the   technologist.   Contrast material: OMNI 300; Contrast volume: 94 ml; Contrast route: IV;      COMPARISON:   No relevant prior studies available.     FINDINGS:   Lungs: Compressive atelectasis of the entire right lower lobe and   inferior aspect of the left lower lobe. Compressive atelectasis at the   posterior aspect of the right middle lobe 1.1 x 0.9 cm left upper lobe   pulmonary nodule, previously 1.9 x 1.8 cm on 12/4/2019. Previously seen   right-sided nodules not seen on the current study, however could be   secondary to the atelectasis in the current study.  Pleural space: Moderate partially loculated right pleural effusion and   small left pleural effusion.  Heart: Normal size. No pericardial effusion.  Vessels: Thoracic aorta normal in caliber. Coronary artery calcifications.  Mediastinal/hilar: No enlarged lymph nodes.  Chest wall/lower neck: No enlarged axillary lymph nodes.  Bones: Partially imaged fat density lesion in the right humeral head   measuring 1.5 cm, likely an intraosseous lipoma.    IMPRESSION:     Moderate partially loculated right pleural effusion with complete   atelectasis of the right lower lobe.    Small left pleural effusion with atelectasis at the inferior aspect of   the left lower lobe.    1.1 cm left upper lobe pulmonary nodule, decreased in size since   12/4/2019, previously 1.9 cm    Previously seen right-sided pulmonary nodules not seen on the current   study which may be secondary to compressive atelectasis in the right   lower and middle lobes; the pulmonary nodules were likely infectious in   etiology.      =========================  PROCEDURE INFORMATION:   Exam: CT Abdomen With Contrast   Exam date and time: 12/17/2019 8:34 PM   Age: 64 years old   Clinical indication: Abdominal pain and other: Liver abscess; Acute;   Chest   pain; Other: Lung lesions     TECHNIQUE:   Imaging protocol: Computedtomography images of the abdomen with   intravenous   contrast.   3D rendering: MIP and/or 3D reconstructed images were created by the   technologist.   Contrast material: OMNI 300; Contrast volume: 94 ml; Contrast route: IV;      COMPARISON:   CT abdomen/pelvis 12/4/2019 and MRI abdomen 12/10/2019.    FINDINGS:   Liver: 2 drainage catheters in the liver with the more superior catheter   in a collection in the right hepatic lobe measuring 3.3 x 2.1 cm (series   3 image 1:30) and the more inferiorcatheter in a collection measuring   3.2 x 1.4 cm (series 3 image 146). There are additional linear foci of   low attenuation in the right hepatic lobe (series 3 image 140), either   additional areas of fluid or dilated biliary radicles. There additional.  Gallbladder and bile ducts: Cholelithiasis. Also see above.  Pancreas: Normal. No ductal dilation.   Spleen: Enlarged measuring 16.7 cm anteroposterior.  Adrenals: Left adrenal gland thickening. Unremarkable right adrenal gland.  Kidneys and ureters: No hydronephrosis. Bilateral nonobstructing renal   calculi and bilateral renal cysts.    Stomach and bowel: Moderate amount retained fecal material in the colon.   No bowel obstruction. Colonic diverticulosis without evidence of   diverticulitis. Appendix is normal. There is a duodenal diverticulum. Fat   density focus at the anterior aspect of the proximal sigmoid colon   (series 3 image 248) with a high attenuation rim, likely an epiploic   appendage or fat necrosis.    Intraperitoneal space: No ascites or free air  Lymph nodes: No enlarged lymph nodes.  Vessels: Abdominal aorta normal in caliber with mild calcified plaque. No   definite thrombus seen in the IVC.  Bones/joints: No aggressive osseous lesion.  Abdominal wall: Small fat-containing umbilical hernia. Small droplet of   air in the right anterior abdominal wall which may be related to the   placement of the drainage catheters or a subcutaneous injection.    IMPRESSION:     2 drainage catheters in the liver which terminate and fluid collections   measuring 3.3 x 2.1 cm and 3.2 x 1.4 cm.    Additional linear foci of low attenuation in the right hepatic lobe,   either additional areas of fluid or dilated intrahepatic biliary radicles    < end of copied text >

## 2019-12-23 LAB
HCT VFR BLD CALC: 30.8 % — LOW (ref 39–50)
HGB BLD-MCNC: 9.2 G/DL — LOW (ref 13–17)
MCHC RBC-ENTMCNC: 26.4 PG — LOW (ref 27–34)
MCHC RBC-ENTMCNC: 29.9 GM/DL — LOW (ref 32–36)
MCV RBC AUTO: 88.5 FL — SIGNIFICANT CHANGE UP (ref 80–100)
NON-GYNECOLOGICAL CYTOLOGY STUDY: SIGNIFICANT CHANGE UP
PLATELET # BLD AUTO: 342 K/UL — SIGNIFICANT CHANGE UP (ref 150–400)
RBC # BLD: 3.48 M/UL — LOW (ref 4.2–5.8)
RBC # FLD: 18.1 % — HIGH (ref 10.3–14.5)
WBC # BLD: 9.32 K/UL — SIGNIFICANT CHANGE UP (ref 3.8–10.5)
WBC # FLD AUTO: 9.32 K/UL — SIGNIFICANT CHANGE UP (ref 3.8–10.5)

## 2019-12-23 PROCEDURE — 99233 SBSQ HOSP IP/OBS HIGH 50: CPT

## 2019-12-23 PROCEDURE — 99232 SBSQ HOSP IP/OBS MODERATE 35: CPT

## 2019-12-23 RX ADMIN — Medication 0.25 MILLIGRAM(S): at 19:34

## 2019-12-23 RX ADMIN — Medication 3 MILLIGRAM(S): at 21:12

## 2019-12-23 RX ADMIN — OXYCODONE HYDROCHLORIDE 5 MILLIGRAM(S): 5 TABLET ORAL at 02:28

## 2019-12-23 RX ADMIN — PIPERACILLIN AND TAZOBACTAM 25 GRAM(S): 4; .5 INJECTION, POWDER, LYOPHILIZED, FOR SOLUTION INTRAVENOUS at 05:30

## 2019-12-23 RX ADMIN — Medication 1 TABLET(S): at 05:31

## 2019-12-23 RX ADMIN — PANTOPRAZOLE SODIUM 40 MILLIGRAM(S): 20 TABLET, DELAYED RELEASE ORAL at 05:31

## 2019-12-23 RX ADMIN — ENOXAPARIN SODIUM 40 MILLIGRAM(S): 100 INJECTION SUBCUTANEOUS at 11:06

## 2019-12-23 RX ADMIN — PIPERACILLIN AND TAZOBACTAM 25 GRAM(S): 4; .5 INJECTION, POWDER, LYOPHILIZED, FOR SOLUTION INTRAVENOUS at 21:12

## 2019-12-23 RX ADMIN — OXYCODONE HYDROCHLORIDE 5 MILLIGRAM(S): 5 TABLET ORAL at 03:00

## 2019-12-23 RX ADMIN — Medication 1 TABLET(S): at 13:43

## 2019-12-23 RX ADMIN — Medication 0.25 MILLIGRAM(S): at 13:43

## 2019-12-23 RX ADMIN — SIMVASTATIN 20 MILLIGRAM(S): 20 TABLET, FILM COATED ORAL at 21:12

## 2019-12-23 RX ADMIN — OXYCODONE HYDROCHLORIDE 5 MILLIGRAM(S): 5 TABLET ORAL at 21:12

## 2019-12-23 RX ADMIN — Medication 150 MILLIGRAM(S): at 11:06

## 2019-12-23 RX ADMIN — PIPERACILLIN AND TAZOBACTAM 25 GRAM(S): 4; .5 INJECTION, POWDER, LYOPHILIZED, FOR SOLUTION INTRAVENOUS at 11:06

## 2019-12-23 NOTE — PROGRESS NOTE ADULT - ASSESSMENT
65y/o  Male  who was transferred from [facility] to [facility]. Patient was initially admitted to [facility] for fever, chills, leukocytosis, was thought to have pneumonia and was also found to have a liver lesion. He had blood cultures which were negative. IR drain was placed which showed liver abscess. Cultures from the abscess with GNR anaerobic. He was maintained on Vancomycin and Zosyn at [facility]. Here patient has been afebrile. Had repeat imaging. Seen by GI. Maintained on Zosyn.      Hepatic abscess  s/p IR drainage at [facility]  Exudative Pleural Effusion   s/p Chest Tube 12/19/19  Hepatic abscess Culture with Gram negative rods (Anaerobic) - Limited growth and not able to be identify       - Pleural fluid culture negative  - Pleural fluid is exudative   - [facility] blood cultures negative  - Hepatic abscess cultures Gram negative rods (Anaerobic) - Limited growth and not able to be identify   - CT A/P with hepatic abscess with drain  - MRI with fluid collection in the liver with drains in place  - Continue Zosyn  - Thoracic surgery consult noted   - GI consult noted  - D/W IR will evaluate when to repeat imaging   - Follow up all cultures  - Trend Fever  - Trend Leukocytosis      Will Follow

## 2019-12-23 NOTE — PROGRESS NOTE ADULT - SUBJECTIVE AND OBJECTIVE BOX
[facility]  INFECTIOUS DISEASES AND INTERNAL MEDICINE at [place]  =======================================================  David Dumont MD  Diplomates American Board of Internal Medicine and Infectious Diseases  Tel: [phone]      Fax: [phone]  =======================================================    SHIN DEGROOT [id number]    Follow up: Hepatic abscess    No fever or chills  no complaints      Allergies:  No Known Allergies      Antibiotics:  piperacillin/tazobactam IVPB.. 3.375 Gram(s) IV Intermittent every 8 hours      REVIEW OF SYSTEMS:  CONSTITUTIONAL:  No Fever or chills  HEENT:  No diplopia or blurred vision.  No earache, sore throat or runny nose.  CARDIOVASCULAR:  No pressure, squeezing, strangling, tightness, heaviness or aching about the chest, neck, axilla or epigastrium.  RESPIRATORY:  No cough, shortness of breath  GASTROINTESTINAL:  No nausea, vomiting or diarrhea.  GENITOURINARY:  No dysuria, frequency or urgency.   MUSCULOSKELETAL:  no joint aches, no muscle pain  SKIN:  No change in skin, hair or nails.  NEUROLOGIC:  No Headaches, seizures or weakness.  PSYCHIATRIC:  No disorder of thought or mood.  ENDOCRINE:  No heat or cold intolerance  HEMATOLOGICAL:  No easy bruising or bleeding.       Physical Exam:  Vital Signs Last 24 Hrs  T(C): 36.2 (23 Dec 2019 07:52), Max: 36.7 (22 Dec 2019 15:04)  T(F): 97.1 (23 Dec 2019 07:52), Max: 98 (22 Dec 2019 15:04)  HR: 88 (23 Dec 2019 07:52) (75 - 88)  BP: 132/85 (23 Dec 2019 07:52) (119/72 - 132/85)  RR: 18 (23 Dec 2019 07:52) (18 - 20)  SpO2: 93% (23 Dec 2019 07:52) (93% - 95%)      GEN: NAD, pleasant  HEENT: normocephalic and atraumatic. EOMI. PERRL.  Anicteric  NECK: Supple.   LUNGS: Clear to auscultation.  HEART: Regular rate and rhythm .  ABDOMEN: Soft, nontender, and nondistended.  Positive bowel sounds.  + Drain   : No CVA tenderness  EXTREMITIES: Without any edema.  MSK: No joint swelling  NEUROLOGIC: No Focal Deficits  PSYCHIATRIC: Appropriate affect .  SKIN: No Rash      Labs:  12-22    138  |  102  |  17.0  ----------------------------<  91  4.3   |  25.0  |  0.91    Ca    9.6      22 Dec 2019 06:44    TPro  7.0  /  Alb  3.0<L>  /  TBili  0.2<L>  /  DBili  x   /  AST  14  /  ALT  13  /  AlkPhos  97  12-22                          9.2    9.32  )-----------( 342      ( 23 Dec 2019 07:14 )             30.8       LIVER FUNCTIONS - ( 22 Dec 2019 06:44 )  Alb: 3.0 g/dL / Pro: 7.0 g/dL / ALK PHOS: 97 U/L / ALT: 13 U/L / AST: 14 U/L / GGT: x             RECENT CULTURES:  12-19 @ 16:01 .Body Fluid     No growth at 3 days.  Culture in progress  Few White blood cells  No organisms seen        OUTSIDE CULTURES:  12/6/19 Body fluid culture from [facility]  Gram negative rods (Anaerobic) - Limited growth and not able to be identify     Blood cultures at [facility] negative      < from: MR Abdomen w/ IV Cont (12.20.19 @ 11:52) >  EXAM:  MR ABDOMEN IC                          PROCEDURE DATE:  12/20/2019          INTERPRETATION:  CLINICAL INFORMATION: Prior MRI showed possible cholangiocarcinoma. Liver abscess. Evaluate for common bile duct stone or cholangiocarcinoma.    COMPARISON: CT chest/abdomen/pelvis 12/11/2019, CT abdomen/pelvis 12/4/2019, an MRI abdomen 12/19/2019    PROCEDURE:   MRI of the abdomen was performed with and without intravenous contrast.  IV Contrast: Gadavist. 8 cc administered, 2 cc discarded.  MRCP was performed.    FINDINGS:    LOWER CHEST: Partially imaged at least moderate size right pleural effusion with compressive atelectasis of the right lower lobe. Small left pleural effusion.    LIVER: 2 drainage catheters again seen in the liver with the tip of one catheter in the region of a 3.2 x 1.2 cm collection (series 905 image 35) and the second catheter tip in the region of a collection more inferiorly measuring 2.8 x 0.9 cm (image 51). The collections have not significantly changed in size since 12/17/2019 when accounted for differences in technique. There is additional high T2 signal extending medially from the more inferior collection, likely inflammatory. Additional branching tracts of low-attenuation are seen in the right hepatic lobe which are seen on the precontrast and early postcontrast sequences, however not seen on the more delayed postcontrast sequences.  BILE DUCTS: Normal caliber. Common bile duct measures 6 mm. No common bile duct stone.  GALLBLADDER: Distended with gallstones. Normal caliber wall.  SPLEEN: Enlarged measuring 15.9 cm anteroposterior.  PANCREAS: 9 mm lipoma in the pancreatic body, otherwise unremarkable.  ADRENALS: Within normal limits.  KIDNEYS/URETERS: Kidneys normal in size. No hydronephrosis. Bilateral renal cysts.    VISUALIZED PORTIONS:    BOWEL: Within normal limits.   PERITONEUM: No ascites.  VESSELS: Abdominal aorta normal in caliber. IVC and hepatic veins are patent. Portal, splenic, and superior mesenteric veins are patent.  RETROPERITONEUM/LYMPH NODES: No lymphadenopathy.    ABDOMINAL WALL: Unremarkable.  BONES: No aggressive osseous lesion.    IMPRESSION:     Cholelithiasis.    Common bile duct normal in caliber with no common bile duct stone.    2 drainage catheters in the liver which terminate in fluid collections measuring 3.2 cm and 2.8 cm, not significantly changed in size since 12/17/2019.    Branching areas of low T1 signal in the right hepatic lobe on the precontrast and early postcontrast sequences, possibly scarring.    Partially imaged at least moderate-sized right pleural effusion with compressive atelectasis of the right lower lobe.    < end of copied text >

## 2019-12-23 NOTE — PROGRESS NOTE ADULT - SUBJECTIVE AND OBJECTIVE BOX
Patient is a 64y old  Male who presents with a chief complaint of Liver abscesses (22 Dec 2019 14:12)      HPI:  64 yr old with PMH of Depression, HLD was complaining of lethargy, wt loss 10 lb,       NO fevers. Chest tube out. No  SOB. MRI friday showed no change in liver abscess. IV antibiotics continue. No fevers. TOlerating solid diet. No abdominal pain.              Denies fever/ pain/ n/v    SH- former smoker, current alcohol use, current marijuana use  FH- M-  - leukemia; F- alive (17 Dec 2019 17:40)      REVIEW OF SYSTEMS:  Constitutional: No fever, weight loss or fatigue  ENMT:  No difficulty hearing, tinnitus, vertigo; No sinus or throat pain  Respiratory: No cough, wheezing, chills or hemoptysis  Cardiovascular: No chest pain, palpitations, dizziness or leg swelling  Gastrointestinal: No abdominal or epigastric pain. No nausea, vomiting or hematemesis; No diarrhea or constipation. No melena or hematochezia.  Skin: No itching, burning, rashes or lesions   Musculoskeletal: No joint pain or swelling; No muscle, back or extremity pain    PAST MEDICAL & SURGICAL HISTORY:  Cholesterol depletion  Depression      FAMILY HISTORY:      SOCIAL HISTORY:  Smoking Status: [ ] Current, [ ] Former, [ ] Never  Pack Years:  [  ] EtOH-no  [  ] IVDA    MEDICATIONS:  MEDICATIONS  (STANDING):  enoxaparin Injectable 40 milliGRAM(s) SubCutaneous daily  influenza   Vaccine 0.5 milliLiter(s) IntraMuscular once  lactobacillus acidophilus 1 Tablet(s) Oral two times a day  pantoprazole    Tablet 40 milliGRAM(s) Oral before breakfast  piperacillin/tazobactam IVPB.. 3.375 Gram(s) IV Intermittent every 8 hours  simvastatin 20 milliGRAM(s) Oral at bedtime  venlafaxine XR. 150 milliGRAM(s) Oral daily    MEDICATIONS  (PRN):  acetaminophen   Tablet .. 650 milliGRAM(s) Oral every 6 hours PRN Temp greater or equal to 38C (100.4F), Mild Pain (1 - 3), Moderate Pain (4 - 6)  ALPRAZolam 0.25 milliGRAM(s) Oral three times a day PRN anxiety  diphenhydrAMINE 25 milliGRAM(s) Oral every 6 hours PRN Rash and/or Itching  melatonin 3 milliGRAM(s) Oral at bedtime PRN Insomnia  ondansetron Injectable 4 milliGRAM(s) IV Push every 6 hours PRN Nausea and/or Vomiting  oxyCODONE    IR 5 milliGRAM(s) Oral every 4 hours PRN Severe Pain (7 - 10)      Allergies    No Known Allergies    Intolerances        Vital Signs Last 24 Hrs  T(C): 36.2 (23 Dec 2019 07:52), Max: 36.7 (22 Dec 2019 15:04)  T(F): 97.1 (23 Dec 2019 07:52), Max: 98 (22 Dec 2019 15:04)  HR: 88 (23 Dec 2019 07:52) (75 - 88)  BP: 132/85 (23 Dec 2019 07:52) (119/72 - 132/85)  BP(mean): --  RR: 18 (23 Dec 2019 07:52) (18 - 20)  SpO2: 93% (23 Dec 2019 07:52) (93% - 95%)    - @ 07:01  -   @ 07:00  --------------------------------------------------------  IN: 0 mL / OUT: 1356 mL / NET: -1356 mL          PHYSICAL EXAM:    General: Well developed; well nourished; in no acute distress  HEENT: MMM, conjunctiva and sclera clear  H- RRR  L- CTA  Gastrointestinal: Soft, non-tender non-distended; Normal bowel sounds; No rebound or guarding  Extremities: Normal range of motion, No clubbing, cyanosis or edema  Neurological: Alert and oriented x3  Skin: Warm and dry. No obvious rash      LABS:                        9.2    9.32  )-----------( 342      ( 23 Dec 2019 07:14 )             30.8     22 Dec 2019 06:44    138    |  102    |  17.0   ----------------------------<  91     4.3     |  25.0   |  0.91     Ca    9.6        22 Dec 2019 06:44    TPro  7.0    /  Alb  3.0    /  TBili  0.2    /  DBili  x      /  AST  14     /  ALT  13     /  AlkPhos  97     / Amylase x      /Lipase x      22 Dec 2019 06:44              RADIOLOGY & ADDITIONAL STUDIES: < from: MR Abdomen w/ IV Cont (19 @ 11:52) >  MPRESSION:     Cholelithiasis.    Common bile duct normal in caliber with no common bile duct stone.    2 drainage catheters in the liver which terminate in fluid collections measuring 3.2 cm and 2.8 cm, not significantly changed in size since 2019.    Branching areas of low T1 signal in the right hepatic lobe on the precontrast and early postcontrast sequences, possibly scarring.    Partially imaged at least moderate-sized right pleural effusion with compressive atelectasis of the right lower lobe.          < end of copied text >

## 2019-12-23 NOTE — PROGRESS NOTE ADULT - PROBLEM SELECTOR PROBLEM 1
Liver abscess
Pleural effusion

## 2019-12-23 NOTE — PROGRESS NOTE ADULT - ASSESSMENT
Patient is a 64 year old with PMH of Depression, HLD who was admitted to [facility] for liver abscess since 12/4/19 and transferred to [facility] for w/u and treatment of possible Cholangiocarcinoma.    Assessment/Plan:    1. Liver abscesses Status post IR Drains on 12/6 at [facility]  Spoke with GI, IR consulted for drain removal today  Spoke with ID, plan for Po augmentin until 12/16 tentatively pending results of pleural fluid cytology    MRCP reviewed- no evidence of CBD dilation/stone or cholangiocarcinoma  Tumor markers are negative. Repeat imaging in 6-8 weeks     2. Lung Nodules and Right Pleural Effusion- Exudative effusion- Cytology pending  If positive plan for po augmentin until 12/16  Status post chest tube removal  Repeat CT chest in 6 weeks     3. Hyperlipidemia on statin therapy    4. Depression on velafaxine    5.  Anxiety/Depression Continue Xanax and Effexor     DVT Prophylaxis - Lovenox SC    PT evaluation pending Patient is a 64 year old with PMH of Depression, HLD who [place] [facility] [place] and transferred to [facility] for w/u and treatment of possible Cholangiocarcinoma.    Assessment/Plan:    1. Liver abscesses Status post IR Drains [place] [facility]  Spoke with GI, IR consulted for drain removal today  Spoke with ID, plan for Po augmentin until 12/16 tentatively pending results of pleural fluid cytology    MRCP reviewed- no evidence of CBD dilation/stone or cholangiocarcinoma  Tumor markers are negative. Repeat imaging in 6-8 weeks     2. Lung Nodules and Right Pleural Effusion- Exudative effusion- Cytology pending  If positive plan for po augmentin until 12/16  Status post chest tube removal  Repeat CT chest in 6 weeks     3. Hyperlipidemia on statin therapy    4. Depression on velafaxine    5.  Anxiety/Depression Continue Xanax and Effexor     6. ROSELINE on nocturnal CPAP     DVT Prophylaxis - Lovenox SC    PT evaluation pending

## 2019-12-23 NOTE — PHYSICAL THERAPY INITIAL EVALUATION ADULT - ADDITIONAL COMMENTS
Patient lives in a house with 2 ELI with railing and no stairs inside. He lives alone, no outside assistance for anything. He was independent prior to admission with all ADLs without use of AD, notes he still drives. He has RW, cane at home.

## 2019-12-23 NOTE — CHART NOTE - NSCHARTNOTEFT_GEN_A_CORE
Liver abscess drains have had minimal drainage over past several days and a recent MRI showed that the collections were only minimal in size, much smaller than when they were originally drained.   Discussed with Dr. Lizama, and decided the drains can come out.  Both were removed uneventfully in IR, with dressings placed at both sites. Tolerated well.

## 2019-12-23 NOTE — PROGRESS NOTE ADULT - SUBJECTIVE AND OBJECTIVE BOX
CC: Follow up      INTERVAL HPI/OVERNIGHT EVENTS: Patient seen and examined, no acute complaints overnight. Denies sob, chest pain or cough> Denies abdominal pain nausea or vomiting. Chest tube removed yesterday      Vital Signs Last 24 Hrs  T(C): 36.2 (23 Dec 2019 07:52), Max: 36.7 (22 Dec 2019 15:04)  T(F): 97.1 (23 Dec 2019 07:52), Max: 98 (22 Dec 2019 15:04)  HR: 88 (23 Dec 2019 07:52) (75 - 88)  BP: 132/85 (23 Dec 2019 07:52) (119/72 - 132/85)  BP(mean): --  RR: 18 (23 Dec 2019 07:52) (18 - 20)  SpO2: 93% (23 Dec 2019 07:52) (93% - 95%)    PHYSICAL EXAM:    GENERAL: NAD, AOX3  HEAD:  Atraumatic, Normocephalic  EYES: EOMI, PERRLA, conjunctiva and sclera clear  CHEST/LUNG: Clear to auscultation bilaterally; No rales, rhonchi, wheezing, or rubs  HEART: Regular rate and rhythm; No murmurs, rubs, or gallops  ABDOMEN: Soft, Nontender, Nondistended; Bowel sounds present  + 2 hepatic drains present  EXTREMITIES:  2+ Peripheral Pulses, No clubbing, cyanosis, or edema        MEDICATIONS  (STANDING):  enoxaparin Injectable 40 milliGRAM(s) SubCutaneous daily  influenza   Vaccine 0.5 milliLiter(s) IntraMuscular once  lactobacillus acidophilus 1 Tablet(s) Oral two times a day  pantoprazole    Tablet 40 milliGRAM(s) Oral before breakfast  piperacillin/tazobactam IVPB.. 3.375 Gram(s) IV Intermittent every 8 hours  simvastatin 20 milliGRAM(s) Oral at bedtime  venlafaxine XR. 150 milliGRAM(s) Oral daily    MEDICATIONS  (PRN):  acetaminophen   Tablet .. 650 milliGRAM(s) Oral every 6 hours PRN Temp greater or equal to 38C (100.4F), Mild Pain (1 - 3), Moderate Pain (4 - 6)  ALPRAZolam 0.25 milliGRAM(s) Oral three times a day PRN anxiety  diphenhydrAMINE 25 milliGRAM(s) Oral every 6 hours PRN Rash and/or Itching  melatonin 3 milliGRAM(s) Oral at bedtime PRN Insomnia  ondansetron Injectable 4 milliGRAM(s) IV Push every 6 hours PRN Nausea and/or Vomiting  oxyCODONE    IR 5 milliGRAM(s) Oral every 4 hours PRN Severe Pain (7 - 10)      Allergies    No Known Allergies    Intolerances          LABS:                          9.2    9.32  )-----------( 342      ( 23 Dec 2019 07:14 )             30.8     12-22    138  |  102  |  17.0  ----------------------------<  91  4.3   |  25.0  |  0.91    Ca    9.6      22 Dec 2019 06:44    TPro  7.0  /  Alb  3.0<L>  /  TBili  0.2<L>  /  DBili  x   /  AST  14  /  ALT  13  /  AlkPhos  97  12-22          RADIOLOGY & ADDITIONAL TESTS:

## 2019-12-23 NOTE — PROGRESS NOTE ADULT - PROBLEM SELECTOR PLAN 1
- Will get MRCP , MRI as pt has hx of gallstones- R/o CBD stone  check LFT  IV antbiotics
continue IV antibiotics.   _ will speak to IR to see if catheters needs any adjustment with positiong  - regular diet
etiology uncelar but apparently improving nicely. ? lung neoplasm. Awaiting final culture results. Awaiting ID consult. Also suggest pulmonary evaluation for lung lesion and pleural effusion. No need for ERCP.
uncleat etiology- will repeat MRI/MRCP as patient does have gallstones.
Exudative-High LDL-771, Protein 4.9, Glu 38  Cytology/Micro pending  D/C CT today after CXR  Daily CXR  IS/OOB  C/W IV ABX as per ID  Cont care as per Primary Team
Exudative-High LDL-771, Protein 4.9, Glu 38  Cytology/Micro pending  Maintain CT to water seal-Monitor output  Daily CXR  IS/OOB  C/W IV ABX as per ID  Cont support care as per Primary Team
Exudative-High LDL-771, Protein 4.9, Glu 38  Cytology/Micro pending  Maintain CT to water seal-Monitor output  Daily CXR  IS/OOB  C/W IV ABX as per ID  D/W Dr. Marsh in AM rounds

## 2019-12-24 ENCOUNTER — TRANSCRIPTION ENCOUNTER (OUTPATIENT)
Age: 64
End: 2019-12-24

## 2019-12-24 VITALS
DIASTOLIC BLOOD PRESSURE: 81 MMHG | OXYGEN SATURATION: 98 % | HEART RATE: 80 BPM | TEMPERATURE: 98 F | SYSTOLIC BLOOD PRESSURE: 129 MMHG | RESPIRATION RATE: 18 BRPM

## 2019-12-24 LAB
CULTURE RESULTS: SIGNIFICANT CHANGE UP
SPECIMEN SOURCE: SIGNIFICANT CHANGE UP

## 2019-12-24 PROCEDURE — 85610 PROTHROMBIN TIME: CPT

## 2019-12-24 PROCEDURE — 99239 HOSP IP/OBS DSCHRG MGMT >30: CPT

## 2019-12-24 PROCEDURE — 83735 ASSAY OF MAGNESIUM: CPT

## 2019-12-24 PROCEDURE — 87070 CULTURE OTHR SPECIMN AEROBIC: CPT

## 2019-12-24 PROCEDURE — 84100 ASSAY OF PHOSPHORUS: CPT

## 2019-12-24 PROCEDURE — 82607 VITAMIN B-12: CPT

## 2019-12-24 PROCEDURE — 87116 MYCOBACTERIA CULTURE: CPT

## 2019-12-24 PROCEDURE — 80076 HEPATIC FUNCTION PANEL: CPT

## 2019-12-24 PROCEDURE — 85730 THROMBOPLASTIN TIME PARTIAL: CPT

## 2019-12-24 PROCEDURE — 82105 ALPHA-FETOPROTEIN SERUM: CPT

## 2019-12-24 PROCEDURE — 82746 ASSAY OF FOLIC ACID SERUM: CPT

## 2019-12-24 PROCEDURE — 83615 LACTATE (LD) (LDH) ENZYME: CPT

## 2019-12-24 PROCEDURE — C1729: CPT

## 2019-12-24 PROCEDURE — 76942 ECHO GUIDE FOR BIOPSY: CPT

## 2019-12-24 PROCEDURE — 83690 ASSAY OF LIPASE: CPT

## 2019-12-24 PROCEDURE — 74177 CT ABD & PELVIS W/CONTRAST: CPT

## 2019-12-24 PROCEDURE — 83986 ASSAY PH BODY FLUID NOS: CPT

## 2019-12-24 PROCEDURE — 84466 ASSAY OF TRANSFERRIN: CPT

## 2019-12-24 PROCEDURE — 36415 COLL VENOUS BLD VENIPUNCTURE: CPT

## 2019-12-24 PROCEDURE — 82945 GLUCOSE OTHER FLUID: CPT

## 2019-12-24 PROCEDURE — 87075 CULTR BACTERIA EXCEPT BLOOD: CPT

## 2019-12-24 PROCEDURE — 99232 SBSQ HOSP IP/OBS MODERATE 35: CPT

## 2019-12-24 PROCEDURE — 97163 PT EVAL HIGH COMPLEX 45 MIN: CPT

## 2019-12-24 PROCEDURE — 88112 CYTOPATH CELL ENHANCE TECH: CPT

## 2019-12-24 PROCEDURE — 83540 ASSAY OF IRON: CPT

## 2019-12-24 PROCEDURE — 82378 CARCINOEMBRYONIC ANTIGEN: CPT

## 2019-12-24 PROCEDURE — 84157 ASSAY OF PROTEIN OTHER: CPT

## 2019-12-24 PROCEDURE — 86140 C-REACTIVE PROTEIN: CPT

## 2019-12-24 PROCEDURE — 85027 COMPLETE CBC AUTOMATED: CPT

## 2019-12-24 PROCEDURE — 87102 FUNGUS ISOLATION CULTURE: CPT

## 2019-12-24 PROCEDURE — 87206 SMEAR FLUORESCENT/ACID STAI: CPT

## 2019-12-24 PROCEDURE — 82728 ASSAY OF FERRITIN: CPT

## 2019-12-24 PROCEDURE — 83550 IRON BINDING TEST: CPT

## 2019-12-24 PROCEDURE — 71045 X-RAY EXAM CHEST 1 VIEW: CPT

## 2019-12-24 PROCEDURE — 87015 SPECIMEN INFECT AGNT CONCNTJ: CPT

## 2019-12-24 PROCEDURE — 89051 BODY FLUID CELL COUNT: CPT

## 2019-12-24 PROCEDURE — 74182 MRI ABDOMEN W/CONTRAST: CPT

## 2019-12-24 PROCEDURE — 80053 COMPREHEN METABOLIC PANEL: CPT

## 2019-12-24 PROCEDURE — 71260 CT THORAX DX C+: CPT

## 2019-12-24 PROCEDURE — 87205 SMEAR GRAM STAIN: CPT

## 2019-12-24 PROCEDURE — 99285 EMERGENCY DEPT VISIT HI MDM: CPT

## 2019-12-24 PROCEDURE — 84155 ASSAY OF PROTEIN SERUM: CPT

## 2019-12-24 PROCEDURE — 82042 OTHER SOURCE ALBUMIN QUAN EA: CPT

## 2019-12-24 PROCEDURE — 94660 CPAP INITIATION&MGMT: CPT

## 2019-12-24 PROCEDURE — 80048 BASIC METABOLIC PNL TOTAL CA: CPT

## 2019-12-24 PROCEDURE — 86301 IMMUNOASSAY TUMOR CA 19-9: CPT

## 2019-12-24 PROCEDURE — 88305 TISSUE EXAM BY PATHOLOGIST: CPT

## 2019-12-24 PROCEDURE — 86803 HEPATITIS C AB TEST: CPT

## 2019-12-24 RX ORDER — SIMVASTATIN 20 MG/1
1 TABLET, FILM COATED ORAL
Qty: 0 | Refills: 0 | DISCHARGE
Start: 2019-12-24

## 2019-12-24 RX ORDER — VENLAFAXINE HCL 75 MG
1 CAPSULE, EXT RELEASE 24 HR ORAL
Qty: 0 | Refills: 0 | DISCHARGE
Start: 2019-12-24

## 2019-12-24 RX ORDER — SACCHAROMYCES BOULARDII 250 MG
1 POWDER IN PACKET (EA) ORAL
Qty: 44 | Refills: 0
Start: 2019-12-24 | End: 2020-01-14

## 2019-12-24 RX ORDER — SIMVASTATIN 20 MG/1
1 TABLET, FILM COATED ORAL
Qty: 0 | Refills: 0 | DISCHARGE

## 2019-12-24 RX ORDER — VENLAFAXINE HCL 75 MG
150 CAPSULE, EXT RELEASE 24 HR ORAL
Qty: 0 | Refills: 0 | DISCHARGE

## 2019-12-24 RX ADMIN — Medication 1 TABLET(S): at 05:52

## 2019-12-24 RX ADMIN — PIPERACILLIN AND TAZOBACTAM 25 GRAM(S): 4; .5 INJECTION, POWDER, LYOPHILIZED, FOR SOLUTION INTRAVENOUS at 05:52

## 2019-12-24 RX ADMIN — PIPERACILLIN AND TAZOBACTAM 25 GRAM(S): 4; .5 INJECTION, POWDER, LYOPHILIZED, FOR SOLUTION INTRAVENOUS at 12:56

## 2019-12-24 RX ADMIN — Medication 150 MILLIGRAM(S): at 10:49

## 2019-12-24 RX ADMIN — Medication 0.25 MILLIGRAM(S): at 12:56

## 2019-12-24 RX ADMIN — Medication 0.25 MILLIGRAM(S): at 00:38

## 2019-12-24 RX ADMIN — ENOXAPARIN SODIUM 40 MILLIGRAM(S): 100 INJECTION SUBCUTANEOUS at 10:49

## 2019-12-24 RX ADMIN — PANTOPRAZOLE SODIUM 40 MILLIGRAM(S): 20 TABLET, DELAYED RELEASE ORAL at 05:52

## 2019-12-24 NOTE — PROGRESS NOTE ADULT - SUBJECTIVE AND OBJECTIVE BOX
HPI: 63 yo WM who is an established with Dr. Loaiza at [facility] who was initially seen a [facility]  with high fevers 10 pound weight loss with leukocytosis and thrombocytosis.  CT scan at that time were concerning for right  hepatic lobe lesions and pulmonary nocules concerning for possible metastases. Hepatic abscess were suspected and 2 drains inserted by IR. Fluid cultures  from 12/6/19 positive for Gram neg rods. He has been treated with IV abx with Vanc/ Zosyn/ Flagyl. Repeat abdominal MRI yesterday demonstrated 2 fluid collections but report does not seemed to be as concerned for cholangiocarcinoma.   Patient also had right sided pleural effusion and a chest tube has been placed which demonstyrated an exudate. Right lung nodule is decreasing in size    Events noted.   The patient reports he is feeling better. no bleeding. no icterus. Getting prepared for discharge. No fevers.    PAST MEDICAL & SURGICAL HISTORY:  Cholesterol depletion  Depression    MEDICATIONS  (STANDING):  enoxaparin Injectable 40 milliGRAM(s) SubCutaneous daily  influenza   Vaccine 0.5 milliLiter(s) IntraMuscular once  lactobacillus acidophilus 1 Tablet(s) Oral two times a day  pantoprazole    Tablet 40 milliGRAM(s) Oral before breakfast  simvastatin 20 milliGRAM(s) Oral at bedtime  venlafaxine XR. 150 milliGRAM(s) Oral daily    MEDICATIONS  (PRN):  acetaminophen   Tablet .. 650 milliGRAM(s) Oral every 6 hours PRN Temp greater or equal to 38C (100.4F), Mild Pain (1 - 3), Moderate Pain (4 - 6)  ALPRAZolam 0.25 milliGRAM(s) Oral three times a day PRN anxiety  diphenhydrAMINE 25 milliGRAM(s) Oral every 6 hours PRN Rash and/or Itching  melatonin 3 milliGRAM(s) Oral at bedtime PRN Insomnia  ondansetron Injectable 4 milliGRAM(s) IV Push every 6 hours PRN Nausea and/or Vomiting  oxyCODONE    IR 5 milliGRAM(s) Oral every 4 hours PRN Severe Pain (7 - 10)    Vital Signs Last 24 Hrs  T(C): 36.9 (24 Dec 2019 15:42), Max: 36.9 (24 Dec 2019 15:42)  T(F): 98.5 (24 Dec 2019 15:42), Max: 98.5 (24 Dec 2019 15:42)  HR: 80 (24 Dec 2019 15:42) (70 - 80)  BP: 129/81 (24 Dec 2019 15:42) (129/81 - 136/66)  BP(mean): --  RR: 18 (24 Dec 2019 15:42) (18 - 19)  SpO2: 98% (24 Dec 2019 15:42) (94% - 98%)  CONSTITUTIONAL: The patient appears well nourished and is in no apparent distress  EYES: EOMI, no scleral icterus, conjunctiva clear,  PULMONARY: right chest tube in place with decreased breath sounds  CARDIOVASCULAR: normal sinus rhythm, no peripheral edema  GASTROINTESTINAL: soft, s, drain in place   EXTREMITIES: No digital cyanosis or clubbing  SKIN: no rashes, lesions, ulcers or bruising  PSYCH: alert and oriented x 3, appropriate affect                          9.2    9.32  )-----------( 342      ( 23 Dec 2019 07:14 )             30.8

## 2019-12-24 NOTE — PROGRESS NOTE ADULT - ASSESSMENT
65y/o  Male  who was transferred from [facility] to [facility]. Patient was initially admitted to [facility] [place] [place] for fever, chills, leukocytosis, was thought to have pneumonia and was also found to have a liver lesion. He had blood cultures which were negative. IR drain was placed which showed liver abscess. Cultures from the abscess with GNR anaerobic. He was maintained on Vancomycin and Zosyn at [facility] [place] [place]. Here patient has been afebrile. Had repeat imaging. Seen by GI. Maintained on Zosyn.      Hepatic abscess  s/p IR drainage at [facility] [place] [place]  Exudative Pleural Effusion   s/p Chest Tube 12/19/19  Hepatic abscess Culture with Gram negative rods (Anaerobic) - Limited growth and not able to be identify       - s/p drain removal 12/23/19  - Pleural fluid culture negative  - Pleural fluid is exudative   - [facility] [place] [place] blood cultures negative  - Hepatic abscess cultures Gram negative rods (Anaerobic) - Limited growth and not able to be identify   - CT A/P with hepatic abscess   - MRI with fluid collection in the liver with drains in place  - Continue Zosyn while in the hospital  - On D/C switch to Augmentin till 1/15/20 to complete 4 weeks from chest tube placement, which will also treat hepatic abscess   - Thoracic surgery consult noted   - GI consult noted  - Follow up all cultures  - Trend Fever  - Trend Leukocytosis      Will Follow

## 2019-12-24 NOTE — PROGRESS NOTE ADULT - SUBJECTIVE AND OBJECTIVE BOX
[facility]  INFECTIOUS DISEASES AND INTERNAL MEDICINE at [place]  =======================================================  David Dumont MD  Diplomates American Board of Internal Medicine and Infectious Diseases  Tel: [phone]      Fax: [phone]  =======================================================    SHIN DEGROOT [id number]    Follow up: Hepatic abscess    No fever or chills  no complaints      Allergies:  No Known Allergies      Antibiotics:  piperacillin/tazobactam IVPB.. 3.375 Gram(s) IV Intermittent every 8 hours      REVIEW OF SYSTEMS:  CONSTITUTIONAL:  No Fever or chills  HEENT:  No diplopia or blurred vision.  No earache, sore throat or runny nose.  CARDIOVASCULAR:  No pressure, squeezing, strangling, tightness, heaviness or aching about the chest, neck, axilla or epigastrium.  RESPIRATORY:  No cough, shortness of breath  GASTROINTESTINAL:  No nausea, vomiting or diarrhea.  GENITOURINARY:  No dysuria, frequency or urgency.   MUSCULOSKELETAL:  no joint aches, no muscle pain  SKIN:  No change in skin, hair or nails.  NEUROLOGIC:  No Headaches, seizures or weakness.  PSYCHIATRIC:  No disorder of thought or mood.  ENDOCRINE:  No heat or cold intolerance  HEMATOLOGICAL:  No easy bruising or bleeding.       Physical Exam:  Vital Signs Last 24 Hrs  T(C): 36.5 (24 Dec 2019 07:42), Max: 36.5 (24 Dec 2019 07:42)  T(F): 97.7 (24 Dec 2019 07:42), Max: 97.7 (24 Dec 2019 07:42)  HR: 70 (24 Dec 2019 07:42) (70 - 93)  BP: 136/66 (24 Dec 2019 07:42) (119/75 - 136/66)  RR: 19 (24 Dec 2019 07:42) (18 - 19)  SpO2: 94% (24 Dec 2019 07:42) (94% - 99%)      GEN: NAD, pleasant  HEENT: normocephalic and atraumatic. EOMI. PERRL.  Anicteric  NECK: Supple.   LUNGS: Clear to auscultation.  HEART: Regular rate and rhythm .  ABDOMEN: Soft, nontender, and nondistended.  Positive bowel sounds.    : No CVA tenderness  EXTREMITIES: Without any edema.  MSK: No joint swelling  NEUROLOGIC: No Focal Deficits  PSYCHIATRIC: Appropriate affect .  SKIN: No Rash      Labs:             9.2    9.32  )-----------( 342      ( 23 Dec 2019 07:14 )             30.8       RECENT CULTURES:  12-19 @ 16:01 .Body Fluid     No growth at 4 days.  Culture in progress  Few White blood cells  No organisms seen      OUTSIDE CULTURES:  12/6/19 Body fluid culture from [facility]  Gram negative rods (Anaerobic) - Limited growth and not able to be identify     Blood cultures at [facility] negative

## 2019-12-24 NOTE — DISCHARGE NOTE PROVIDER - NSDCMRMEDTOKEN_GEN_ALL_CORE_FT
amoxicillin-clavulanate 875 mg-125 mg oral tablet: 875 milligram(s) orally every 12 hours   Florastor 250 mg oral capsule: 1 cap(s) orally 2 times a day   simvastatin 20 mg oral tablet: 1 tab(s) orally once a day (at bedtime)  venlafaxine 150 mg oral capsule, extended release: 1 cap(s) orally once a day

## 2019-12-24 NOTE — PROGRESS NOTE ADULT - ASSESSMENT
Patient is a 64 year old with PMH of Depression, HLD who was admitted to [facility] [place] abscess since 12/4/19 and transferred to [facility] for w/u and treatment of possible Cholangiocarcinoma. MRCP was negative for cholangiocarcinoma, no evidence of CBD dilation/stone; tumor markers were negative and LFTS normalized. CT chest was done which showed a large right pleural effusion; chest tube was placed by CT surgery. Clinically improved, chest tube removed fluid consistent with exudate; cytology was negative. Patient was seen by IR, hepatic drains were removed. Per ID, to continue PO Augmentin until 1/15/19 for a total of 4 weeks of antibiotics. To follow up with GI for follow up CT and colonoscopy as outpatient. Discharge home in stable condition.     Assessment/Plan:    1. Liver abscesses Status post IR Drains on 12/6 at [facility]; status post removal now  PO augmentin until 1/15  Follow up with GI for repeat imaging in 6-8 weeks    MRCP reviewed- no evidence of CBD dilation/stone or cholangiocarcinoma  Tumor markers are negative.     2. Lung Nodules and Right Pleural Effusion- Exudative effusion- Cytology negative  To be treated for suspected empyema- PO augmentin until 1/15  Status post chest tube removal  Repeat CT chest in 6 weeks     3. Hyperlipidemia on statin therapy    4. Depression on velafaxine    5.  Anxiety/Depression Continue Xanax and Effexor     6. ROSELINE on nocturnal CPAP     DVT Prophylaxis - Lovenox SC     Discharge home

## 2019-12-24 NOTE — CHART NOTE - NSCHARTNOTEFT_GEN_A_CORE
Source: Patient [x]  Family [ ]   other [ ]    Current Diet:   Diet, DASH/TLC:   Sodium & Cholesterol Restricted (12-17-19 @ 18:02    PO intake:  < 50% [ ]   50-75%  [ ]   %  [x]  other :    Source for PO intake [x] Patient [ ] family [ ] chart [ ] staff [ ] other    Current Weight:   (12/17)  179.8 lbs    % Weight Change: No recent weight documented, Pt OOB to chair unable to obtain new weight    Pertinent Medications: MEDICATIONS  (STANDING):  enoxaparin Injectable 40 milliGRAM(s) SubCutaneous daily  influenza   Vaccine 0.5 milliLiter(s) IntraMuscular once  lactobacillus acidophilus 1 Tablet(s) Oral two times a day  pantoprazole    Tablet 40 milliGRAM(s) Oral before breakfast  piperacillin/tazobactam IVPB.. 3.375 Gram(s) IV Intermittent every 8 hours  simvastatin 20 milliGRAM(s) Oral at bedtime  venlafaxine XR. 150 milliGRAM(s) Oral daily    MEDICATIONS  (PRN):  acetaminophen   Tablet .. 650 milliGRAM(s) Oral every 6 hours PRN Temp greater or equal to 38C (100.4F), Mild Pain (1 - 3), Moderate Pain (4 - 6)  ALPRAZolam 0.25 milliGRAM(s) Oral three times a day PRN anxiety  diphenhydrAMINE 25 milliGRAM(s) Oral every 6 hours PRN Rash and/or Itching  melatonin 3 milliGRAM(s) Oral at bedtime PRN Insomnia  ondansetron Injectable 4 milliGRAM(s) IV Push every 6 hours PRN Nausea and/or Vomiting  oxyCODONE    IR 5 milliGRAM(s) Oral every 4 hours PRN Severe Pain (7 - 10)    Pertinent Labs: CBC Full  -  ( 23 Dec 2019 07:14 )  WBC Count : 9.32 K/uL  RBC Count : 3.48 M/uL  Hemoglobin : 9.2 g/dL  Hematocrit : 30.8 %  Platelet Count - Automated : 342 K/uL  Mean Cell Volume : 88.5 fl  Mean Cell Hemoglobin : 26.4 pg  Mean Cell Hemoglobin Concentration : 29.9 gm/dL    Skin: No skin breakdown/edema noted    Nutrition focused physical exam conducted - found signs of malnutrition [ ]absent [ ]present    Subcutaneous fat loss: [ ] Orbital fat pads region, [ ]Buccal fat region, [ ]Triceps region,  [ ]Ribs region    Muscle wasting: [ ]Temples region, [ ]Clavicle region, [ ]Shoulder region, [ ]Scapula region, [ ]Interosseous region,  [ ]thigh region, [ ]Calf region    Estimated Needs:   [ ] no change since previous assessment  [ ] recalculated:     Current Nutrition Diagnosis: Pt remains at high nutrition risk secondary to malnutrition (severe chronic) related to inability to meet increased protein-energy needs in setting of liver abscess as evidenced by pt meeting <75% est energy needs x1mo, severe muscle/fat loss, 14% wt loss x2 months. Pt reports PO intake greatly improved, completing 100% of meals and feeling hungry in between meals. Discussed allowing double protein and small snacks in between meals. Pt agreeable, no other complaints at this time, wanting to go home.     Recommendations:   Continue with current nutrition plan   Add Ensure Enlive BID   Rx MVI daily     Monitoring and Evaluation:   [x] PO intake [x] Tolerance to diet prescription [X] Weights  [X] Follow up per protocol [X] Labs:

## 2019-12-24 NOTE — PROGRESS NOTE ADULT - SUBJECTIVE AND OBJECTIVE BOX
Chief Complaint: This is a 64y old man patient being seen in follow-up consultation for liver abscesses.    HPI / 24H events:  Patient had drains removed yesterday.  Reports feeling well without any abdominal pain, nausea, vomiting, fevers, or chills.      ROS: A 14-point review of systems was reviewed and was otherwise negative save what was reported in the HPI.    PAST MEDICAL/SURGICAL HISTORY:  Cholesterol depletion  Depression    MEDICATIONS  (STANDING):  enoxaparin Injectable 40 milliGRAM(s) SubCutaneous daily  influenza   Vaccine 0.5 milliLiter(s) IntraMuscular once  lactobacillus acidophilus 1 Tablet(s) Oral two times a day  pantoprazole    Tablet 40 milliGRAM(s) Oral before breakfast  piperacillin/tazobactam IVPB.. 3.375 Gram(s) IV Intermittent every 8 hours  simvastatin 20 milliGRAM(s) Oral at bedtime  venlafaxine XR. 150 milliGRAM(s) Oral daily    MEDICATIONS  (PRN):  acetaminophen   Tablet .. 650 milliGRAM(s) Oral every 6 hours PRN Temp greater or equal to 38C (100.4F), Mild Pain (1 - 3), Moderate Pain (4 - 6)  ALPRAZolam 0.25 milliGRAM(s) Oral three times a day PRN anxiety  diphenhydrAMINE 25 milliGRAM(s) Oral every 6 hours PRN Rash and/or Itching  melatonin 3 milliGRAM(s) Oral at bedtime PRN Insomnia  ondansetron Injectable 4 milliGRAM(s) IV Push every 6 hours PRN Nausea and/or Vomiting  oxyCODONE    IR 5 milliGRAM(s) Oral every 4 hours PRN Severe Pain (7 - 10)    No Known Allergies    T(C): 36.5 (12-24-19 @ 07:42), Max: 36.5 (12-24-19 @ 07:42)  HR: 70 (12-24-19 @ 07:42) (70 - 93)  BP: 136/66 (12-24-19 @ 07:42) (119/75 - 136/66)  RR: 19 (12-24-19 @ 07:42) (18 - 19)  SpO2: 94% (12-24-19 @ 07:42) (94% - 99%)    I&O's Summary    PHYSICAL EXAM:  Constitutional: Well-developed, well-nourished, in no apparent distress  Eyes: Sclerae anicteric, conjunctivae normal  ENMT: Mucus membranes moist, no oropharyngeal thrush noted  Neck: No thyroid nodules appreciated, no significant cervical or supraclavicular lymphadenopathy  Respiratory: Breathing nonlabored  Cardiovascular: Regular rate and rhythm  Gastrointestinal: Soft, nontender, nondistended, normoactive bowel sounds; no hepatosplenomegaly appreciated; no rebound tenderness or involuntary guarding  Extremities: No clubbing, cyanosis or edema  Neurological: Alert and oriented to person, place and time; no asterixis  Skin: No jaundice  Lymph Nodes: No significant lymphadenopathy  Musculoskeletal: No significant peripheral atrophy  Psychiatric: Affect and mood appropriate                   9.2    9.32  )-----------( 342      ( 12-23 @ 07:14 )             30.8                8.9    8.92  )-----------( 356      ( 12-22 @ 06:44 )             29.4           IMAGING: I personally reviewed the [XXXXXX], and I agree with the radiologist's interpretation as described below:

## 2019-12-24 NOTE — DISCHARGE NOTE PROVIDER - HOSPITAL COURSE
Patient is a 64 year old with PMH of Depression, HLD who was admitted to [facility] for liver abscess since 12/4/19 and transferred to [facility] for w/u and treatment of possible Cholangiocarcinoma. MRCP was negative for cholangiocarcinoma, no evidence of CBD dilation/stone; tumor markers were negative and LFTS normalized. CT chest was done which showed a large right pleural effusion; chest tube was placed by CT surgery. Clinically improved, chest tube removed fluid consistent with exudate; cytology was negative. Patient was seen by IR, hepatic drains were removed. Per ID, to continue PO Augmentin until 1/15/19 for a total of 4 weeks of antibiotics. To follow up with GI for follow up CT and colonoscopy as outpatient. Discharge home in stable condition.             53 mins spent

## 2019-12-24 NOTE — DISCHARGE NOTE PROVIDER - NSDCCPCAREPLAN_GEN_ALL_CORE_FT
PRINCIPAL DISCHARGE DIAGNOSIS  Diagnosis: Liver abscess  Assessment and Plan of Treatment: Status post removal of drains  To complete PO Augmentin as prescribed until 1/15/2019  To follow up with GI, for repeat CT of the abdomen and pelvis in 6 weeks for follow up  You will need to follow up with GI for a follow up colonoscopy to rule out Gi source of infection      SECONDARY DISCHARGE DIAGNOSES  Diagnosis: Pleural effusion  Assessment and Plan of Treatment: Improved  You were found to have a lung nodule on CT, you will need a repeat CT scan of the chest in 4-6 weeks to ensure it has resolved.   Follow up with your PMD

## 2019-12-24 NOTE — PROGRESS NOTE ADULT - ASSESSMENT
Patient with multiple liver abscesses on pip/tazo.  Source and organism unknown.  Though he had a colonoscopy this past April, he will need eventual colonoscopy to rule out colonic neoplasm.  This can be done by his primary gastroenterologist (Dr. Dumont of [place]) as an outpatient.  Supportive care per primary team.  Antibiosis per ID.  No further recommendations.    Thank you for the consult.  GI will sign off.  Please reconsult as needed and call with any questions or concerns.     ZURDO Hickey MD  [facility] Physician [facility]  Division of Gastroenterology  Tel [phone]  Fax [phone]

## 2019-12-24 NOTE — PROGRESS NOTE ADULT - ASSESSMENT
Hepatic abscess: MRCP does not demonstrate malignancy, CBD dilation and tumor markers are normal. Plan on repeat outpatient imaging in 6-8 weeks to confirm resolution of these lesion. on abx per ID    Leukocytosis, anemia and thrombocytosis: reactive to underlying infection. no heme intervention required at this time. WBC wnl. Labs stable.    DVT prophylaxis: lovenox

## 2019-12-24 NOTE — PROGRESS NOTE ADULT - SUBJECTIVE AND OBJECTIVE BOX
CC: Follow up    INTERVAL HPI/OVERNIGHT EVENTS: Patient seen and examined, afebrile overnight. No acute complaints.       Vital Signs Last 24 Hrs  T(C): 36.5 (24 Dec 2019 07:42), Max: 36.5 (24 Dec 2019 07:42)  T(F): 97.7 (24 Dec 2019 07:42), Max: 97.7 (24 Dec 2019 07:42)  HR: 70 (24 Dec 2019 07:42) (70 - 93)  BP: 136/66 (24 Dec 2019 07:42) (119/75 - 136/66)  BP(mean): --  RR: 19 (24 Dec 2019 07:42) (18 - 19)  SpO2: 94% (24 Dec 2019 07:42) (94% - 99%)    PHYSICAL EXAM:    GENERAL: NAD, AOX3  HEAD:  Atraumatic, Normocephalic  ENMT: Moist mucous membranes  CHEST/LUNG: Clear to auscultation bilaterally; No rales, rhonchi, wheezing, or rubs  HEART: Regular rate and rhythm; No murmurs, rubs, or gallops  ABDOMEN: Soft, Nontender, Nondistended; Bowel sounds present  EXTREMITIES:  2+ Peripheral Pulses, No clubbing, cyanosis, or edema        MEDICATIONS  (STANDING):  enoxaparin Injectable 40 milliGRAM(s) SubCutaneous daily  influenza   Vaccine 0.5 milliLiter(s) IntraMuscular once  lactobacillus acidophilus 1 Tablet(s) Oral two times a day  pantoprazole    Tablet 40 milliGRAM(s) Oral before breakfast  piperacillin/tazobactam IVPB.. 3.375 Gram(s) IV Intermittent every 8 hours  simvastatin 20 milliGRAM(s) Oral at bedtime  venlafaxine XR. 150 milliGRAM(s) Oral daily    MEDICATIONS  (PRN):  acetaminophen   Tablet .. 650 milliGRAM(s) Oral every 6 hours PRN Temp greater or equal to 38C (100.4F), Mild Pain (1 - 3), Moderate Pain (4 - 6)  ALPRAZolam 0.25 milliGRAM(s) Oral three times a day PRN anxiety  diphenhydrAMINE 25 milliGRAM(s) Oral every 6 hours PRN Rash and/or Itching  melatonin 3 milliGRAM(s) Oral at bedtime PRN Insomnia  ondansetron Injectable 4 milliGRAM(s) IV Push every 6 hours PRN Nausea and/or Vomiting  oxyCODONE    IR 5 milliGRAM(s) Oral every 4 hours PRN Severe Pain (7 - 10)      Allergies    No Known Allergies    Intolerances    OK Double Portions Protein when requested (Unknown)        LABS:                          9.2    9.32  )-----------( 342      ( 23 Dec 2019 07:14 )             30.8                 RADIOLOGY & ADDITIONAL TESTS:

## 2019-12-24 NOTE — PROGRESS NOTE ADULT - REASON FOR ADMISSION
Pleural effusion
Liver abscess
Liver abscesses
Liver abscesses
liver abscess
liver abscesses
fever
fevers
Pleural Effusion

## 2019-12-24 NOTE — DISCHARGE NOTE NURSING/CASE MANAGEMENT/SOCIAL WORK - PATIENT PORTAL LINK FT
You can access the FollowMyHealth Patient Portal offered by [facility] by registering at the following website: [email or web address]. By joining [employer]’s FollowMyHealth portal, you will also be able to view your health information using other applications (apps) compatible with our system.

## 2019-12-26 PROBLEM — F32.9 MAJOR DEPRESSIVE DISORDER, SINGLE EPISODE, UNSPECIFIED: Chronic | Status: ACTIVE | Noted: 2019-12-17

## 2019-12-26 PROBLEM — E78.6 LIPOPROTEIN DEFICIENCY: Chronic | Status: ACTIVE | Noted: 2019-12-17

## 2019-12-31 ENCOUNTER — APPOINTMENT (OUTPATIENT)
Dept: PULMONOLOGY | Facility: CLINIC | Age: 64
End: 2019-12-31
Payer: COMMERCIAL

## 2019-12-31 VITALS
OXYGEN SATURATION: 99 % | BODY MASS INDEX: 29.25 KG/M2 | WEIGHT: 193 LBS | HEIGHT: 68 IN | HEART RATE: 91 BPM | SYSTOLIC BLOOD PRESSURE: 136 MMHG | DIASTOLIC BLOOD PRESSURE: 82 MMHG

## 2019-12-31 DIAGNOSIS — Z86.39 PERSONAL HISTORY OF OTHER ENDOCRINE, NUTRITIONAL AND METABOLIC DISEASE: ICD-10-CM

## 2019-12-31 DIAGNOSIS — Z87.891 PERSONAL HISTORY OF NICOTINE DEPENDENCE: ICD-10-CM

## 2019-12-31 DIAGNOSIS — E78.1 PURE HYPERGLYCERIDEMIA: ICD-10-CM

## 2019-12-31 DIAGNOSIS — Z82.49 FAMILY HISTORY OF ISCHEMIC HEART DISEASE AND OTHER DISEASES OF THE CIRCULATORY SYSTEM: ICD-10-CM

## 2019-12-31 PROCEDURE — 99495 TRANSJ CARE MGMT MOD F2F 14D: CPT

## 2019-12-31 RX ORDER — OMEGA-3-ACID ETHYL ESTERS 1 G/1
1 CAPSULE, LIQUID FILLED ORAL
Refills: 0 | Status: ACTIVE | COMMUNITY

## 2019-12-31 RX ORDER — SIMVASTATIN 20 MG/1
20 TABLET, FILM COATED ORAL
Refills: 0 | Status: ACTIVE | COMMUNITY

## 2019-12-31 RX ORDER — ALPRAZOLAM 0.5 MG/1
0.5 TABLET ORAL
Refills: 0 | Status: ACTIVE | COMMUNITY

## 2019-12-31 RX ORDER — BACILLUS COAGULANS/INULIN 1B-250 MG
CAPSULE ORAL
Refills: 0 | Status: ACTIVE | COMMUNITY

## 2019-12-31 RX ORDER — VENLAFAXINE HYDROCHLORIDE 150 MG/1
150 CAPSULE, EXTENDED RELEASE ORAL
Refills: 0 | Status: ACTIVE | COMMUNITY

## 2019-12-31 RX ORDER — AMOXICILLIN AND CLAVULANATE POTASSIUM 875; 125 MG/1; MG/1
875-125 TABLET, COATED ORAL
Refills: 0 | Status: ACTIVE | COMMUNITY

## 2019-12-31 NOTE — DISCUSSION/SUMMARY
[FreeTextEntry1] : 64-year-old male, seen today for the above, status post recent hospitalization with treatment and drainage of a right pleural effusion. Effusion, most likely sympathetic in nature, secondary to intrahepatic abscess. No evidence of malignancy. Films reviewed with the patient.\par \par (Hospitalization, including PACs. Review)

## 2019-12-31 NOTE — CONSULT LETTER
[Consult Letter:] : I had the pleasure of evaluating your patient, [unfilled]. [Dear  ___] : Dear  [unfilled], [Please see my note below.] : Please see my note below. [Consult Closing:] : Thank you very much for allowing me to participate in the care of this patient.  If you have any questions, please do not hesitate to contact me. [Sincerely,] : Sincerely, [FreeTextEntry3] : Ash Hammond MD FCCP\par Pulmonary/Critical Care/Sleep Medicine\par Department of Internal Medicine\par \par [facility] [employer]\par

## 2019-12-31 NOTE — REASON FOR VISIT
[Follow-Up - From Hospitalization] : a hospitalization follow-up [FreeTextEntry2] : Pleural effusion

## 2019-12-31 NOTE — PHYSICAL EXAM
[General Appearance - Well Developed] : well developed [Well Groomed] : well groomed [Normal Appearance] : normal appearance [General Appearance - Well Nourished] : well nourished [No Deformities] : no deformities [General Appearance - In No Acute Distress] : no acute distress [Normal Conjunctiva] : the conjunctiva exhibited no abnormalities [Eyelids - No Xanthelasma] : the eyelids demonstrated no xanthelasmas [Normal Oropharynx] : normal oropharynx [Neck Appearance] : the appearance of the neck was normal [Neck Cervical Mass (___cm)] : no neck mass was observed [Jugular Venous Distention Increased] : there was no jugular-venous distention [Thyroid Nodule] : there were no palpable thyroid nodules [Thyroid Diffuse Enlargement] : the thyroid was not enlarged [Heart Sounds] : normal S1 and S2 [Heart Rate And Rhythm] : heart rate and rhythm were normal [Murmurs] : no murmurs present [Respiration, Rhythm And Depth] : normal respiratory rhythm and effort [Exaggerated Use Of Accessory Muscles For Inspiration] : no accessory muscle use [Auscultation Breath Sounds / Voice Sounds] : lungs were clear to auscultation bilaterally [Abdomen Soft] : soft [Abdomen Tenderness] : non-tender [Abdomen Mass (___ Cm)] : no abdominal mass palpated [Abnormal Walk] : normal gait [Gait - Sufficient For Exercise Testing] : the gait was sufficient for exercise testing [Nail Clubbing] : no clubbing of the fingernails [Cyanosis, Localized] : no localized cyanosis [Petechial Hemorrhages (___cm)] : no petechial hemorrhages [Skin Color & Pigmentation] : normal skin color and pigmentation [] : no rash [Skin Turgor] : normal skin turgor [Deep Tendon Reflexes (DTR)] : deep tendon reflexes were 2+ and symmetric [Sensation] : the sensory exam was normal to light touch and pinprick [No Focal Deficits] : no focal deficits [FreeTextEntry1] : Normal

## 2019-12-31 NOTE — HISTORY OF PRESENT ILLNESS
[FreeTextEntry1] : 64-year-old male with a history of hyperlipidemia, asthma as a child, seen today status post recent discharge from [facility] from 12/17-12/24/19. Patient was admitted to the [facility] for abdominal pain and found to have a liver abscess. He was transferred to [facility], where he underwent a MRCP, which was negative for plantar carcinoma without any evidence of cholelithiasis or other positive tumor markers. Patient did have a large right pleural effusion, which was evaluated and treated with a chest tube. Fluid analysis was consistent with an exudate with negative cytology. Patient has been maintained on Augmentin since discharge, which will be completed by the middle of January

## 2020-01-10 ENCOUNTER — APPOINTMENT (OUTPATIENT)
Dept: GASTROENTEROLOGY | Facility: CLINIC | Age: 65
End: 2020-01-10
Payer: COMMERCIAL

## 2020-01-10 VITALS
SYSTOLIC BLOOD PRESSURE: 140 MMHG | DIASTOLIC BLOOD PRESSURE: 95 MMHG | HEIGHT: 68 IN | OXYGEN SATURATION: 98 % | RESPIRATION RATE: 17 BRPM | WEIGHT: 196 LBS | BODY MASS INDEX: 29.7 KG/M2 | HEART RATE: 80 BPM

## 2020-01-10 DIAGNOSIS — Z80.42 FAMILY HISTORY OF MALIGNANT NEOPLASM OF PROSTATE: ICD-10-CM

## 2020-01-10 DIAGNOSIS — Z78.9 OTHER SPECIFIED HEALTH STATUS: ICD-10-CM

## 2020-01-10 DIAGNOSIS — Z80.6 FAMILY HISTORY OF LEUKEMIA: ICD-10-CM

## 2020-01-10 PROCEDURE — 99214 OFFICE O/P EST MOD 30 MIN: CPT

## 2020-01-10 RX ORDER — POLYETHYLENE GLYOCOL 3350, SODIUM CHLORIDE, SODIUM BICARBONATE AND POTASSIUM CHLORIDE 420; 11.2; 5.72; 1.48 G/4L; G/4L; G/4L; G/4L
420 POWDER, FOR SOLUTION NASOGASTRIC; ORAL
Qty: 1 | Refills: 0 | Status: ACTIVE | COMMUNITY
Start: 2020-01-10 | End: 1900-01-01

## 2020-01-10 RX ORDER — AMOXICILLIN AND CLAVULANATE POTASSIUM 875; 125 MG/1; MG/1
875-125 TABLET, COATED ORAL
Refills: 0 | Status: ACTIVE | COMMUNITY

## 2020-01-10 NOTE — PHYSICAL EXAM
[General Appearance - Alert] : alert [General Appearance - In No Acute Distress] : in no acute distress [Sclera] : the sclera and conjunctiva were normal [PERRL With Normal Accommodation] : pupils were equal in size, round, and reactive to light [Extraocular Movements] : extraocular movements were intact [Outer Ear] : the ears and nose were normal in appearance [Oropharynx] : the oropharynx was normal [Neck Appearance] : the appearance of the neck was normal [Neck Cervical Mass (___cm)] : no neck mass was observed [Jugular Venous Distention Increased] : there was no jugular-venous distention [Thyroid Diffuse Enlargement] : the thyroid was not enlarged [Thyroid Nodule] : there were no palpable thyroid nodules [Auscultation Breath Sounds / Voice Sounds] : lungs were clear to auscultation bilaterally [Heart Sounds] : normal S1 and S2 [Heart Rate And Rhythm] : heart rate was normal and rhythm regular [Heart Sounds Gallop] : no gallops [Murmurs] : no murmurs [Heart Sounds Pericardial Friction Rub] : no pericardial rub [Full Pulse] : the pedal pulses are present [Edema] : there was no peripheral edema [Bowel Sounds] : normal bowel sounds [Abdomen Soft] : soft [Abdomen Tenderness] : non-tender [Abdomen Mass (___ Cm)] : no abdominal mass palpated [Cervical Lymph Nodes Enlarged Posterior Bilaterally] : posterior cervical [Cervical Lymph Nodes Enlarged Anterior Bilaterally] : anterior cervical [Supraclavicular Lymph Nodes Enlarged Bilaterally] : supraclavicular [Femoral Lymph Nodes Enlarged Bilaterally] : femoral [Axillary Lymph Nodes Enlarged Bilaterally] : axillary [No CVA Tenderness] : no ~M costovertebral angle tenderness [Inguinal Lymph Nodes Enlarged Bilaterally] : inguinal [No Spinal Tenderness] : no spinal tenderness [Musculoskeletal - Swelling] : no joint swelling seen [Nail Clubbing] : no clubbing  or cyanosis of the fingernails [Abnormal Walk] : normal gait [Motor Tone] : muscle strength and tone were normal [Skin Color & Pigmentation] : normal skin color and pigmentation [Skin Turgor] : normal skin turgor [] : no rash [No Focal Deficits] : no focal deficits [Oriented To Time, Place, And Person] : oriented to person, place, and time [Impaired Insight] : insight and judgment were intact [Affect] : the affect was normal

## 2020-01-11 NOTE — HISTORY OF PRESENT ILLNESS
[de-identified] : 64-year-old male with a history of hyperlipidemia, asthma as a child, seen today status post recent discharge from [facility] from 12/17-12/24/19. Patient was admitted to the [facility] for abdominal pain, abnormal LFTs and found to have a liver abscess. He underwent liver abscess drainage. There was concern for cholangiocarcinoma. Ca 19-9 level was normal. He was transferred to [facility], where he underwent a MRCP, which was negative for any cholangiocarcinoma.  Patient did have a large right pleural effusion, which was evaluated and treated with a chest tube. Fluid analysis was consistent with an exudate with negative cytology. Patient has been maintained on Augmentin since discharge, which will be completed by the middle of January.\par \par Patient was noted to be anemic. Tumor markers were normal. His abscess drains were removed.\par \par He mentioned that he had colonoscopy in april 2019 which was limited with poor bowel preparation in right colon. Then he had capsule endoscopy / smart pill. Results are unclear. \par \par Today, he feels much better. More energy. Good appetite.

## 2020-01-11 NOTE — ASSESSMENT
[FreeTextEntry1] : Liver abscess: Etiology clear. Recommended to obtain labs and perform EGD and colonoscopy. After that, will evaluate with abdominal imaging in next 3 months.  Will obtain chest X ray before that. \par \par Risks (including bleeding, pain, perforation, incomplete examination, adverse reactions to medications, aspiration and death), benefits and alternatives were discussed. Patient is agreeable for the EGD. The patient is medically optimized for the procedure. We will schedule the patient for the procedure.\par \par The bowel preparation was discussed at length. Will give extra magnesium citrate for the bowel preparation.Risks (including bleeding, pain, perforation, incomplete examination, splenic laceration, adverse reactions to medications, aspiration and death), benefits and alternatives were discussed. Patient is agreeable for the colonoscopy. The patient is medically optimized for the procedure. We will schedule the patient for the procedure. Bowel preparation was sent to the pharmacy.\par \par Cyril Lizama MD\par Gastroenterology \par \par

## 2020-01-15 LAB
CULTURE RESULTS: SIGNIFICANT CHANGE UP
SPECIMEN SOURCE: SIGNIFICANT CHANGE UP

## 2020-01-21 ENCOUNTER — APPOINTMENT (OUTPATIENT)
Dept: INTERNAL MEDICINE | Facility: CLINIC | Age: 65
End: 2020-01-21
Payer: COMMERCIAL

## 2020-01-21 VITALS
HEIGHT: 68 IN | WEIGHT: 199 LBS | SYSTOLIC BLOOD PRESSURE: 150 MMHG | DIASTOLIC BLOOD PRESSURE: 95 MMHG | BODY MASS INDEX: 30.16 KG/M2

## 2020-01-21 DIAGNOSIS — K75.0 ABSCESS OF LIVER: ICD-10-CM

## 2020-01-21 PROCEDURE — 99213 OFFICE O/P EST LOW 20 MIN: CPT

## 2020-01-27 ENCOUNTER — TRANSCRIPTION ENCOUNTER (OUTPATIENT)
Age: 65
End: 2020-01-27

## 2020-02-06 ENCOUNTER — RESULT REVIEW (OUTPATIENT)
Age: 65
End: 2020-02-06

## 2020-02-06 ENCOUNTER — APPOINTMENT (OUTPATIENT)
Dept: GASTROENTEROLOGY | Facility: HOSPITAL | Age: 65
End: 2020-02-06

## 2020-02-06 ENCOUNTER — OUTPATIENT (OUTPATIENT)
Dept: OUTPATIENT SERVICES | Facility: HOSPITAL | Age: 65
LOS: 1 days | End: 2020-02-06
Payer: COMMERCIAL

## 2020-02-06 DIAGNOSIS — D64.9 ANEMIA, UNSPECIFIED: ICD-10-CM

## 2020-02-06 PROCEDURE — 88305 TISSUE EXAM BY PATHOLOGIST: CPT

## 2020-02-06 PROCEDURE — 88305 TISSUE EXAM BY PATHOLOGIST: CPT | Mod: 26

## 2020-02-06 PROCEDURE — 88342 IMHCHEM/IMCYTCHM 1ST ANTB: CPT | Mod: 26

## 2020-02-06 PROCEDURE — 88342 IMHCHEM/IMCYTCHM 1ST ANTB: CPT

## 2020-02-06 PROCEDURE — 43239 EGD BIOPSY SINGLE/MULTIPLE: CPT | Mod: 59

## 2020-02-06 PROCEDURE — 45378 DIAGNOSTIC COLONOSCOPY: CPT

## 2020-02-06 PROCEDURE — 43239 EGD BIOPSY SINGLE/MULTIPLE: CPT

## 2020-02-06 NOTE — REASON FOR VISIT
[Procedure: _________] : a [unfilled] procedure visit [Endoscopy] : an endoscopy [Family Member] : family member [Colonoscopy] : a colonoscopy

## 2020-02-06 NOTE — PHYSICAL EXAM
[General Appearance - Alert] : alert [Sclera] : the sclera and conjunctiva were normal [General Appearance - In No Acute Distress] : in no acute distress [PERRL With Normal Accommodation] : pupils were equal in size, round, and reactive to light [Outer Ear] : the ears and nose were normal in appearance [Extraocular Movements] : extraocular movements were intact [Neck Cervical Mass (___cm)] : no neck mass was observed [Neck Appearance] : the appearance of the neck was normal [Oropharynx] : the oropharynx was normal [Jugular Venous Distention Increased] : there was no jugular-venous distention [Thyroid Diffuse Enlargement] : the thyroid was not enlarged [Thyroid Nodule] : there were no palpable thyroid nodules [Heart Rate And Rhythm] : heart rate was normal and rhythm regular [Auscultation Breath Sounds / Voice Sounds] : lungs were clear to auscultation bilaterally [Heart Sounds] : normal S1 and S2 [Heart Sounds Gallop] : no gallops [Murmurs] : no murmurs [Heart Sounds Pericardial Friction Rub] : no pericardial rub [Full Pulse] : the pedal pulses are present [Bowel Sounds] : normal bowel sounds [Edema] : there was no peripheral edema [Abdomen Soft] : soft [Abdomen Tenderness] : non-tender [Abdomen Mass (___ Cm)] : no abdominal mass palpated [Cervical Lymph Nodes Enlarged Anterior Bilaterally] : anterior cervical [Cervical Lymph Nodes Enlarged Posterior Bilaterally] : posterior cervical [Axillary Lymph Nodes Enlarged Bilaterally] : axillary [Femoral Lymph Nodes Enlarged Bilaterally] : femoral [Supraclavicular Lymph Nodes Enlarged Bilaterally] : supraclavicular [Inguinal Lymph Nodes Enlarged Bilaterally] : inguinal [No CVA Tenderness] : no ~M costovertebral angle tenderness [No Spinal Tenderness] : no spinal tenderness [Abnormal Walk] : normal gait [Nail Clubbing] : no clubbing  or cyanosis of the fingernails [Musculoskeletal - Swelling] : no joint swelling seen [Motor Tone] : muscle strength and tone were normal [Skin Color & Pigmentation] : normal skin color and pigmentation [Skin Turgor] : normal skin turgor [No Focal Deficits] : no focal deficits [] : no rash [Impaired Insight] : insight and judgment were intact [Oriented To Time, Place, And Person] : oriented to person, place, and time [Affect] : the affect was normal

## 2020-02-06 NOTE — REASON FOR VISIT
[Procedure: _________] : a [unfilled] procedure visit [Endoscopy] : an endoscopy [Colonoscopy] : a colonoscopy [Family Member] : family member

## 2020-02-06 NOTE — PROCEDURE
[With Biopsy] : with biopsy [Anemia] : anemia [3] : 3 [Erythema] : erythema [de-identified] : Z line at 43 cm [de-identified] : s/p cold biopsy [de-identified] : s/p cold biopsy [de-identified] : s/p cold biopsy [de-identified] : s/p cold biopsy [Procedure Explained] : The procedure was explained [Allergies Reviewed] : allergies reviewed. [Risks] : Risks [Benefits] : benefits [Alternatives] : alternatives [Consent Obtained] : written consent was obtained prior to the procedure and is detailed in the patient's record [Patient] : the patient [Bowel Prep Kit] : the patient took the appropriate bowel preparation kit as directed [Approved Diet Followed] : the patient avoided solid foods and adhered to the approved diet list for 24 hours prior to the procedure [Automated Blood Pressure Cuff] : automated blood pressure cuff [Pulse Oximeter] : pulse oximeter [2] : 2 [Cardiac Monitor] : cardiac monitor [Prep Qualtiy: ___] : Prep Quality:  [unfilled] [Performed By: ___] : Performed by:  NELLA [Abnormal Rectum] : a normal rectum [External Hemorrhoids] : no external hemorrhoids [Left Lateral Decubitus] : The patient was positioned in the left lateral decubitus position [Normal Prostate] : a normal prostate [Cecum (Landmarks)] : and guided to the cecum which was identified by the anatomic landmarks of the appendiceal orifice and ileocecal valve [Insufflated] : insufflated [No Difficulty] : without difficulty [Single Pass Needed] : after a single pass [Retroflex View] : a retroflex view of the rectum was performed [Normal] : Normal [Patient Rotated Into Alternating Positions] : the patient was not rotated [Diverticulosis] : diverticulosis [Hemorrhoids] : hemorrhoids [Vital Signs Stable] : the vital signs were stable [Sent to Pathology] : was sent to pathology for analysis [Tolerated Well] : the patient tolerated the procedure well [de-identified] : Anemia [No Complications] : There were no complications [de-identified] : Normal ileocecal valve [de-identified] : None

## 2020-02-06 NOTE — PROCEDURE
[With Biopsy] : with biopsy [Anemia] : anemia [3] : 3 [Erythema] : erythema [de-identified] : Z line at 43 cm [de-identified] : s/p cold biopsy [de-identified] : s/p cold biopsy [de-identified] : s/p cold biopsy [de-identified] : s/p cold biopsy [Procedure Explained] : The procedure was explained [Allergies Reviewed] : allergies reviewed. [Benefits] : benefits [Risks] : Risks [Consent Obtained] : written consent was obtained prior to the procedure and is detailed in the patient's record [Alternatives] : alternatives [Patient] : the patient [Bowel Prep Kit] : the patient took the appropriate bowel preparation kit as directed [Approved Diet Followed] : the patient avoided solid foods and adhered to the approved diet list for 24 hours prior to the procedure [Automated Blood Pressure Cuff] : automated blood pressure cuff [Cardiac Monitor] : cardiac monitor [2] : 2 [Pulse Oximeter] : pulse oximeter [Prep Qualtiy: ___] : Prep Quality:  [unfilled] [Performed By: ___] : Performed by:  NELLA [External Hemorrhoids] : no external hemorrhoids [Left Lateral Decubitus] : The patient was positioned in the left lateral decubitus position [Abnormal Rectum] : a normal rectum [Normal Prostate] : a normal prostate [Cecum (Landmarks)] : and guided to the cecum which was identified by the anatomic landmarks of the appendiceal orifice and ileocecal valve [No Difficulty] : without difficulty [Insufflated] : insufflated [Single Pass Needed] : after a single pass [Retroflex View] : a retroflex view of the rectum was performed [Normal] : Normal [Patient Rotated Into Alternating Positions] : the patient was not rotated [Diverticulosis] : diverticulosis [Hemorrhoids] : hemorrhoids [Tolerated Well] : the patient tolerated the procedure well [Sent to Pathology] : was sent to pathology for analysis [Vital Signs Stable] : the vital signs were stable [No Complications] : There were no complications [de-identified] : Anemia [de-identified] : None [de-identified] : Normal ileocecal valve

## 2020-02-06 NOTE — PHYSICAL EXAM
[Sclera] : the sclera and conjunctiva were normal [General Appearance - Alert] : alert [General Appearance - In No Acute Distress] : in no acute distress [PERRL With Normal Accommodation] : pupils were equal in size, round, and reactive to light [Outer Ear] : the ears and nose were normal in appearance [Extraocular Movements] : extraocular movements were intact [Oropharynx] : the oropharynx was normal [Neck Cervical Mass (___cm)] : no neck mass was observed [Neck Appearance] : the appearance of the neck was normal [Thyroid Diffuse Enlargement] : the thyroid was not enlarged [Jugular Venous Distention Increased] : there was no jugular-venous distention [Thyroid Nodule] : there were no palpable thyroid nodules [Auscultation Breath Sounds / Voice Sounds] : lungs were clear to auscultation bilaterally [Heart Rate And Rhythm] : heart rate was normal and rhythm regular [Heart Sounds] : normal S1 and S2 [Murmurs] : no murmurs [Heart Sounds Gallop] : no gallops [Full Pulse] : the pedal pulses are present [Heart Sounds Pericardial Friction Rub] : no pericardial rub [Bowel Sounds] : normal bowel sounds [Edema] : there was no peripheral edema [Abdomen Soft] : soft [Abdomen Mass (___ Cm)] : no abdominal mass palpated [Abdomen Tenderness] : non-tender [Cervical Lymph Nodes Enlarged Posterior Bilaterally] : posterior cervical [Cervical Lymph Nodes Enlarged Anterior Bilaterally] : anterior cervical [Femoral Lymph Nodes Enlarged Bilaterally] : femoral [Supraclavicular Lymph Nodes Enlarged Bilaterally] : supraclavicular [Axillary Lymph Nodes Enlarged Bilaterally] : axillary [Inguinal Lymph Nodes Enlarged Bilaterally] : inguinal [No CVA Tenderness] : no ~M costovertebral angle tenderness [No Spinal Tenderness] : no spinal tenderness [Abnormal Walk] : normal gait [Nail Clubbing] : no clubbing  or cyanosis of the fingernails [Musculoskeletal - Swelling] : no joint swelling seen [Motor Tone] : muscle strength and tone were normal [Skin Color & Pigmentation] : normal skin color and pigmentation [Skin Turgor] : normal skin turgor [No Focal Deficits] : no focal deficits [] : no rash [Impaired Insight] : insight and judgment were intact [Oriented To Time, Place, And Person] : oriented to person, place, and time [Affect] : the affect was normal

## 2020-02-06 NOTE — PROCEDURE
[With Biopsy] : with biopsy [Anemia] : anemia [3] : 3 [Erythema] : erythema [de-identified] : Z line at 43 cm [de-identified] : s/p cold biopsy [de-identified] : s/p cold biopsy [de-identified] : s/p cold biopsy [de-identified] : s/p cold biopsy [Procedure Explained] : The procedure was explained [Allergies Reviewed] : allergies reviewed. [Risks] : Risks [Benefits] : benefits [Consent Obtained] : written consent was obtained prior to the procedure and is detailed in the patient's record [Alternatives] : alternatives [Patient] : the patient [Bowel Prep Kit] : the patient took the appropriate bowel preparation kit as directed [Automated Blood Pressure Cuff] : automated blood pressure cuff [Approved Diet Followed] : the patient avoided solid foods and adhered to the approved diet list for 24 hours prior to the procedure [Pulse Oximeter] : pulse oximeter [2] : 2 [Cardiac Monitor] : cardiac monitor [Prep Qualtiy: ___] : Prep Quality:  [unfilled] [Performed By: ___] : Performed by:  NELLA [Left Lateral Decubitus] : The patient was positioned in the left lateral decubitus position [Abnormal Rectum] : a normal rectum [External Hemorrhoids] : no external hemorrhoids [Normal Prostate] : a normal prostate [Cecum (Landmarks)] : and guided to the cecum which was identified by the anatomic landmarks of the appendiceal orifice and ileocecal valve [Insufflated] : insufflated [No Difficulty] : without difficulty [Single Pass Needed] : after a single pass [Retroflex View] : a retroflex view of the rectum was performed [Normal] : Normal [Patient Rotated Into Alternating Positions] : the patient was not rotated [Diverticulosis] : diverticulosis [Hemorrhoids] : hemorrhoids [Vital Signs Stable] : the vital signs were stable [Sent to Pathology] : was sent to pathology for analysis [Tolerated Well] : the patient tolerated the procedure well [de-identified] : Anemia [No Complications] : There were no complications [de-identified] : None [de-identified] : Normal ileocecal valve

## 2020-02-06 NOTE — ASSESSMENT
[FreeTextEntry1] : IMPRESSION:\par Diverticulosis of colon\par Internal hemorrhoids\par \par RECOMMENDATIONS:\par Follow up as outpatient

## 2020-02-06 NOTE — PHYSICAL EXAM
[General Appearance - In No Acute Distress] : in no acute distress [General Appearance - Alert] : alert [Sclera] : the sclera and conjunctiva were normal [PERRL With Normal Accommodation] : pupils were equal in size, round, and reactive to light [Outer Ear] : the ears and nose were normal in appearance [Extraocular Movements] : extraocular movements were intact [Neck Appearance] : the appearance of the neck was normal [Oropharynx] : the oropharynx was normal [Neck Cervical Mass (___cm)] : no neck mass was observed [Jugular Venous Distention Increased] : there was no jugular-venous distention [Thyroid Diffuse Enlargement] : the thyroid was not enlarged [Thyroid Nodule] : there were no palpable thyroid nodules [Heart Rate And Rhythm] : heart rate was normal and rhythm regular [Auscultation Breath Sounds / Voice Sounds] : lungs were clear to auscultation bilaterally [Heart Sounds Gallop] : no gallops [Murmurs] : no murmurs [Heart Sounds] : normal S1 and S2 [Full Pulse] : the pedal pulses are present [Heart Sounds Pericardial Friction Rub] : no pericardial rub [Abdomen Soft] : soft [Bowel Sounds] : normal bowel sounds [Edema] : there was no peripheral edema [Abdomen Mass (___ Cm)] : no abdominal mass palpated [Abdomen Tenderness] : non-tender [Cervical Lymph Nodes Enlarged Posterior Bilaterally] : posterior cervical [Cervical Lymph Nodes Enlarged Anterior Bilaterally] : anterior cervical [Femoral Lymph Nodes Enlarged Bilaterally] : femoral [Axillary Lymph Nodes Enlarged Bilaterally] : axillary [Supraclavicular Lymph Nodes Enlarged Bilaterally] : supraclavicular [No CVA Tenderness] : no ~M costovertebral angle tenderness [Inguinal Lymph Nodes Enlarged Bilaterally] : inguinal [No Spinal Tenderness] : no spinal tenderness [Abnormal Walk] : normal gait [Nail Clubbing] : no clubbing  or cyanosis of the fingernails [Musculoskeletal - Swelling] : no joint swelling seen [Motor Tone] : muscle strength and tone were normal [Skin Color & Pigmentation] : normal skin color and pigmentation [Skin Turgor] : normal skin turgor [No Focal Deficits] : no focal deficits [] : no rash [Oriented To Time, Place, And Person] : oriented to person, place, and time [Impaired Insight] : insight and judgment were intact [Affect] : the affect was normal

## 2020-02-08 LAB
CULTURE RESULTS: SIGNIFICANT CHANGE UP
SPECIMEN SOURCE: SIGNIFICANT CHANGE UP

## 2020-02-10 LAB — SURGICAL PATHOLOGY STUDY: SIGNIFICANT CHANGE UP

## 2020-02-25 ENCOUNTER — APPOINTMENT (OUTPATIENT)
Dept: CT IMAGING | Facility: CLINIC | Age: 65
End: 2020-02-25
Payer: COMMERCIAL

## 2020-02-25 PROCEDURE — 71250 CT THORAX DX C-: CPT

## 2020-02-27 ENCOUNTER — APPOINTMENT (OUTPATIENT)
Dept: PULMONOLOGY | Facility: CLINIC | Age: 65
End: 2020-02-27
Payer: COMMERCIAL

## 2020-02-27 VITALS
WEIGHT: 215 LBS | HEART RATE: 99 BPM | BODY MASS INDEX: 32.58 KG/M2 | DIASTOLIC BLOOD PRESSURE: 82 MMHG | HEIGHT: 68.25 IN | OXYGEN SATURATION: 94 % | SYSTOLIC BLOOD PRESSURE: 130 MMHG

## 2020-02-27 DIAGNOSIS — J90 PLEURAL EFFUSION, NOT ELSEWHERE CLASSIFIED: ICD-10-CM

## 2020-02-27 PROCEDURE — 94664 DEMO&/EVAL PT USE INHALER: CPT | Mod: 59

## 2020-02-27 PROCEDURE — 85018 HEMOGLOBIN: CPT | Mod: QW

## 2020-02-27 PROCEDURE — 94060 EVALUATION OF WHEEZING: CPT

## 2020-02-27 PROCEDURE — 94729 DIFFUSING CAPACITY: CPT

## 2020-02-27 PROCEDURE — 99215 OFFICE O/P EST HI 40 MIN: CPT | Mod: 25

## 2020-02-27 PROCEDURE — 94727 GAS DIL/WSHOT DETER LNG VOL: CPT

## 2020-02-27 NOTE — PHYSICAL EXAM
[No Acute Distress] : no acute distress [Normal Oropharynx] : normal oropharynx [Normal Appearance] : normal appearance [No Neck Mass] : no neck mass [Normal Rate/Rhythm] : normal rate/rhythm [Normal S1, S2] : normal s1, s2 [No Murmurs] : no murmurs [No Resp Distress] : no resp distress [Clear to Auscultation Bilaterally] : clear to auscultation bilaterally [No Abnormalities] : no abnormalities [Benign] : benign [No Clubbing] : no clubbing [Normal Gait] : normal gait [No Cyanosis] : no cyanosis [No Edema] : no edema [FROM] : FROM [Normal Color/ Pigmentation] : normal color/ pigmentation [No Focal Deficits] : no focal deficits [Oriented x3] : oriented x3 [Normal Affect] : normal affect

## 2020-02-27 NOTE — DISCUSSION/SUMMARY
[FreeTextEntry1] : 64-year-old male, seen today for followup today of right effusion. Effusion, most likely prosthetic secondary to liver disease and is now resolved completely. Patient has no complaints of respiratory compromise.

## 2020-02-27 NOTE — HISTORY OF PRESENT ILLNESS
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 At that time he underwent a drainage of a light right pleural effusion, which was exudative in nature, but with negative cytology. He is seen today for followup. He denies any complaints of cough, wheeze, shortness of breath, chest pains, palpitations, lightheadedness, dizziness.

## 2020-03-01 ENCOUNTER — TRANSCRIPTION ENCOUNTER (OUTPATIENT)
Age: 65
End: 2020-03-01

## 2020-09-30 NOTE — PHYSICAL THERAPY INITIAL EVALUATION ADULT - GAIT PATTERN USED, PT EVAL
5-Fu Pregnancy And Lactation Text: This medication is Pregnancy Category X and contraindicated in pregnancy and in women who may become pregnant. It is unknown if this medication is excreted in breast milk. 2-point gait

## 2020-10-02 NOTE — PROGRESS NOTE ADULT - SUBJECTIVE AND OBJECTIVE BOX
Patient information on fall and injury prevention HPI: 65 yo WM who is an established with Dr. Loaiza at [facility] who was initially seen a [facility]  with high fevers 10 pound weight loss with leukocytosis and thrombocytosis.  CT scan at that time were concerning for right  hepatic lobe lesions and pulmonary nocules concerning for possible metastases. Hepatic abscess were suspected and 2 drains inserted by IR. Fluid cultures  from 12/6/19 positive for Gram neg rods. He has been treated with IV abx with Vanc/ Zosyn/ Flagyl. Repeat abdominal MRI yesterday demonstrated 2 fluid collections but report does not seemed to be as concerned for cholangiocarcinoma.   Patient also had right sided pleural effusion and a chest tube has been placed which demonstyrated an exudate. Right lung nodule is decreasing in size    Events noted.   The patient reports he is feeling better. continues to be afebrile. no bleeding. no icterus    PAST MEDICAL & SURGICAL HISTORY:  Cholesterol depletion  Depression      MEDICATIONS  (STANDING):  enoxaparin Injectable 40 milliGRAM(s) SubCutaneous daily  influenza   Vaccine 0.5 milliLiter(s) IntraMuscular once  lactobacillus acidophilus 1 Tablet(s) Oral two times a day  pantoprazole    Tablet 40 milliGRAM(s) Oral before breakfast  piperacillin/tazobactam IVPB.. 3.375 Gram(s) IV Intermittent every 8 hours  simvastatin 20 milliGRAM(s) Oral at bedtime  venlafaxine XR. 150 milliGRAM(s) Oral daily    Vital Signs Last 24 Hrs  T(C): 36.8 (22 Dec 2019 07:44), Max: 36.8 (22 Dec 2019 07:44)  T(F): 98.3 (22 Dec 2019 07:44), Max: 98.3 (22 Dec 2019 07:44)  HR: 74 (22 Dec 2019 07:44) (74 - 91)  BP: 122/75 (22 Dec 2019 07:44) (115/77 - 122/75)  BP(mean): --  RR: 19 (22 Dec 2019 07:44) (19 - 19)  SpO2: 94% (22 Dec 2019 07:44) (94% - 98%)    CONSTITUTIONAL: The patient appears well nourished and is in no apparent distress  EYES: EOMI, no scleral icterus, conjunctiva clear,  PULMONARY: right chest tube in place with decreased breath sounds  CARDIOVASCULAR: normal sinus rhythm, no peripheral edema  GASTROINTESTINAL: soft, s, drain in place   EXTREMITIES: No digital cyanosis or clubbing  SKIN: no rashes, lesions, ulcers or bruising  PSYCH: alert and oriented x 3, appropriate affect                8.9                  138  | 25.0 | 17.0         8.92  >-----------< 356     ------------------------< 91                    29.4                 4.3  | 102  | 0.91                                         Ca 9.6   Mg x     Ph x

## 2022-02-07 NOTE — PROGRESS NOTE ADULT - PROVIDER SPECIALTY LIST ADULT
Infectious Disease Patient cancelled appointment on 2/7/22 with Dr. Keith Das for NPT visit. Reason: Unknown    Patient did not reschedule appointment. Appointment rescheduled:  Pt does not want to reschedule.

## 2022-04-19 ENCOUNTER — OUTPATIENT (OUTPATIENT)
Dept: OUTPATIENT SERVICES | Facility: HOSPITAL | Age: 67
LOS: 1 days | End: 2022-04-19

## 2022-04-19 DIAGNOSIS — E78.5 HYPERLIPIDEMIA, UNSPECIFIED: ICD-10-CM

## 2022-06-16 ENCOUNTER — OUTPATIENT (OUTPATIENT)
Dept: OUTPATIENT SERVICES | Facility: HOSPITAL | Age: 67
LOS: 1 days | End: 2022-06-16

## 2022-06-16 DIAGNOSIS — Z29.9 ENCOUNTER FOR PROPHYLACTIC MEASURES, UNSPECIFIED: ICD-10-CM

## 2022-06-21 ENCOUNTER — OUTPATIENT (OUTPATIENT)
Dept: OUTPATIENT SERVICES | Facility: HOSPITAL | Age: 67
LOS: 1 days | End: 2022-06-21

## 2022-06-21 DIAGNOSIS — Z29.8 ENCOUNTER FOR OTHER SPECIFIED PROPHYLACTIC MEASURES: ICD-10-CM

## 2022-06-22 ENCOUNTER — OUTPATIENT (OUTPATIENT)
Dept: OUTPATIENT SERVICES | Facility: HOSPITAL | Age: 67
LOS: 1 days | End: 2022-06-22

## 2022-06-22 DIAGNOSIS — Z20.828 CONTACT WITH AND (SUSPECTED) EXPOSURE TO OTHER VIRAL COMMUNICABLE DISEASES: ICD-10-CM

## 2022-06-28 ENCOUNTER — OUTPATIENT (OUTPATIENT)
Dept: OUTPATIENT SERVICES | Facility: HOSPITAL | Age: 67
LOS: 1 days | End: 2022-06-28

## 2022-06-28 DIAGNOSIS — Z29.8 ENCOUNTER FOR OTHER SPECIFIED PROPHYLACTIC MEASURES: ICD-10-CM

## 2022-06-29 ENCOUNTER — OUTPATIENT (OUTPATIENT)
Dept: OUTPATIENT SERVICES | Facility: HOSPITAL | Age: 67
LOS: 1 days | End: 2022-06-29

## 2022-06-29 DIAGNOSIS — Z29.9 ENCOUNTER FOR PROPHYLACTIC MEASURES, UNSPECIFIED: ICD-10-CM

## 2023-09-13 NOTE — PATIENT PROFILE ADULT - ...
Last Office Visit  -  07/07/2023  Next Office Visit  -  n/a    Last Filled  -    Last UDS -    Contract -
18-Dec-2019 00:27:05

## 2023-10-23 NOTE — DIETITIAN INITIAL EVALUATION ADULT. - LAB (SPECIFY)
Patient would like to have a vasectomy and would like to know if he needs a referral or if you can recommend someone? H/H, LFTs

## 2024-02-01 NOTE — PROCEDURE
Take medication as needed.  Please go home and take your blood pressure medication.  Repeat is coming down.  But he did not take his medicine.  The cough medicine will help.  Follow-up.  
Term birth of male 
[FreeTextEntry1] : M: XR CHEST PORTABLE URGENT 1V \par \par PROCEDURE DATE: 12/22/2019 \par \par \par \par INTERPRETATION: Exam Date: 12/22/2019 4:34 PM \par \par History: Chest tube removal \par \par Technique: Single frontal portable view of the chest with comparison to \par 12/22/2019 at 1:30 \par \par Findings: \par \par Heart is mildly prominent. Interval removal of the right pigtail catheter. \par Persistent right pleural effusion with right basilar atelectasis and or \par pneumonia. No definite right apical pneumothorax. Right upper quadrant \par surgical drain. Degenerative changes of the visualized osseous structures. \par \par \par \par Impression: \par \par Interval removal of the right pigtail catheter. Right pleural effusion with \par right basilar atelectasis and or pneumonia. \par \par \par KERRI SHAFER M.D., ATTENDING RADIOLOGIST \par This document has been electronically signed. Dec 22 2019 5:34PM

## 2025-04-08 NOTE — ED PROVIDER NOTE - OBJECTIVE STATEMENT
Patient received discharge instructions, left ED in stable condition. Ambulatory, steady gait noted.  
The patient is a 64 year old male presents with abdominal pain sent from [facility] for ERCP.  The patient has history of liver abscess being treated with biliary drainage